# Patient Record
Sex: MALE | Race: WHITE | NOT HISPANIC OR LATINO | Employment: FULL TIME | URBAN - METROPOLITAN AREA
[De-identification: names, ages, dates, MRNs, and addresses within clinical notes are randomized per-mention and may not be internally consistent; named-entity substitution may affect disease eponyms.]

---

## 2018-10-10 ENCOUNTER — TELEPHONE (OUTPATIENT)
Dept: FAMILY MEDICINE | Facility: CLINIC | Age: 63
End: 2018-10-10

## 2018-10-10 DIAGNOSIS — Z01.818 PRE-OP EXAM: Primary | ICD-10-CM

## 2018-10-10 NOTE — TELEPHONE ENCOUNTER
Pt is having retinal surgery with Dr. Hurley and will be sedated.  What labs do you want for the pre-op?

## 2018-10-11 NOTE — TELEPHONE ENCOUNTER
Cbc, cmp for pre op but if he wants his routine labs drawn at the same time we can order them. It looks like he hasn't had routine labs since 2014

## 2018-10-13 LAB
ALBUMIN SERPL-MCNC: 4.6 G/DL (ref 3.6–4.8)
ALBUMIN/GLOB SERPL: 2.2 {RATIO} (ref 1.2–2.2)
ALP SERPL-CCNC: 74 IU/L (ref 39–117)
ALT SERPL-CCNC: 15 IU/L (ref 0–44)
AST SERPL-CCNC: 14 IU/L (ref 0–40)
BASOPHILS # BLD AUTO: 0 X10E3/UL (ref 0–0.2)
BASOPHILS NFR BLD AUTO: 0 %
BILIRUB SERPL-MCNC: 0.6 MG/DL (ref 0–1.2)
BUN SERPL-MCNC: 25 MG/DL (ref 8–27)
BUN/CREAT SERPL: 24 (ref 10–24)
CALCIUM SERPL-MCNC: 9.1 MG/DL (ref 8.6–10.2)
CHLORIDE SERPL-SCNC: 103 MMOL/L (ref 96–106)
CO2 SERPL-SCNC: 21 MMOL/L (ref 20–29)
CREAT SERPL-MCNC: 1.03 MG/DL (ref 0.76–1.27)
EGFR IF AFRICAN AMERICAN: 89 ML/MIN/1.73
EOSINOPHIL # BLD AUTO: 0.2 X10E3/UL (ref 0–0.4)
EOSINOPHIL NFR BLD AUTO: 3 %
ERYTHROCYTE [DISTWIDTH] IN BLOOD BY AUTOMATED COUNT: 13.2 % (ref 12.3–15.4)
EST. GFR  (NON AFRICAN AMERICAN): 77 ML/MIN/1.73
GLOBULIN SER CALC-MCNC: 2.1 G/DL (ref 1.5–4.5)
GLUCOSE SERPL-MCNC: 135 MG/DL (ref 65–99)
HCT VFR BLD AUTO: 45.5 % (ref 37.5–51)
HGB BLD-MCNC: 15.3 G/DL (ref 13–17.7)
IMM GRANULOCYTES # BLD: 0 X10E3/UL (ref 0–0.1)
IMM GRANULOCYTES NFR BLD: 0 %
LYMPHOCYTES # BLD AUTO: 1.2 X10E3/UL (ref 0.7–3.1)
LYMPHOCYTES NFR BLD AUTO: 21 %
MCH RBC QN AUTO: 29.4 PG (ref 26.6–33)
MCHC RBC AUTO-ENTMCNC: 33.6 G/DL (ref 31.5–35.7)
MCV RBC AUTO: 88 FL (ref 79–97)
MONOCYTES # BLD AUTO: 0.4 X10E3/UL (ref 0.1–0.9)
MONOCYTES NFR BLD AUTO: 7 %
NEUTROPHILS # BLD AUTO: 3.9 X10E3/UL (ref 1.4–7)
NEUTROPHILS NFR BLD AUTO: 69 %
PLATELET # BLD AUTO: 183 X10E3/UL (ref 150–379)
POTASSIUM SERPL-SCNC: 4.4 MMOL/L (ref 3.5–5.2)
PROT SERPL-MCNC: 6.7 G/DL (ref 6–8.5)
RBC # BLD AUTO: 5.2 X10E6/UL (ref 4.14–5.8)
SODIUM SERPL-SCNC: 144 MMOL/L (ref 134–144)
WBC # BLD AUTO: 5.7 X10E3/UL (ref 3.4–10.8)

## 2018-10-16 ENCOUNTER — OFFICE VISIT (OUTPATIENT)
Dept: FAMILY MEDICINE | Facility: CLINIC | Age: 63
End: 2018-10-16
Payer: COMMERCIAL

## 2018-10-16 VITALS
HEART RATE: 74 BPM | OXYGEN SATURATION: 97 % | WEIGHT: 226 LBS | DIASTOLIC BLOOD PRESSURE: 70 MMHG | HEIGHT: 69 IN | SYSTOLIC BLOOD PRESSURE: 110 MMHG | BODY MASS INDEX: 33.47 KG/M2

## 2018-10-16 DIAGNOSIS — R73.9 HYPERGLYCEMIA: ICD-10-CM

## 2018-10-16 DIAGNOSIS — Z01.818 PRE-OP EXAM: Primary | ICD-10-CM

## 2018-10-16 LAB — HBA1C MFR BLD: 5.2 %

## 2018-10-16 PROCEDURE — 83036 HEMOGLOBIN GLYCOSYLATED A1C: CPT | Mod: QW,,, | Performed by: NURSE PRACTITIONER

## 2018-10-16 PROCEDURE — 99203 OFFICE O/P NEW LOW 30 MIN: CPT | Mod: ,,, | Performed by: NURSE PRACTITIONER

## 2018-10-16 NOTE — PATIENT INSTRUCTIONS
4 Steps for Eating Healthier  Changing the way you eat can improve your health. It can lower your cholesterol and blood pressure, and help you stay at a healthy weight. Your diet doesnt have to be bland and boring to be healthy. Just watch your calories and follow these steps:    1. Eat fewer unhealthy fats  · Choose more fish and lean meats instead of fatty cuts of meat.  · Skip butter and lard, and use less margarine.  · Pass on foods that have palm, coconut, or hydrogenated oils.  · Eat fewer high-fat dairy foods like cheese, ice cream, and whole milk.  · Get a heart-healthy cookbook and try some low-fat recipes.  2. Go light on salt  · Keep the saltshaker off the table.  · Limit high-salt ingredients, such as soy sauce, bouillon, and garlic salt.  · Instead of adding salt when cooking, season your food with herbs and flavorings. Try lemon, garlic, and onion.  · Limit convenience foods, such as boxed or canned foods and restaurant food.  · Read food labels and choose lower-sodium options.  3. Limit sugar  · Pause before you add sugars to pancakes, cereal, coffee, or tea. This includes white and brown table sugar, syrup, honey, and molasses. Cut your usual amount by half.  · Use non-sugar sweeteners. Stevia, aspartame, and sucralose can satisfy a sweet tooth without adding calories.  · Swap out sugar-filled soda and other drinks. Buy sugar-free or low-calorie beverages. Remember water is always the best choice.  · Read labels and choose foods with less added sugar. Keep in mind that dairy foods and foods with fruit will have some natural sugar.  · Cut the sugar in recipes by 1/3 to 1/2. Boost the flavor with extracts like almond, vanilla, or orange. Or add spices such as cinnamon or nutmeg.  4. Eat more fiber  · Eat fresh fruits and vegetables every day.  · Boost your diet with whole grains. Go for oats, whole-grain rice, and bran.  · Add beans and lentils to your meals.  · Drink more water to match your fiber  increase. This is to help prevent constipation.  Date Last Reviewed: 5/11/2015  © 6979-5363 [employer], [employer]. [address] [place] [address], [place] [address]. All rights reserved. This information is not intended as a substitute for professional medical care. Always follow your healthcare professional's instructions.

## 2018-10-16 NOTE — PROGRESS NOTES
SUBJECTIVE:      Patient ID: Jacky Greenfield is a 63 y.o. male.    Chief Complaint: Pre-op Exam (Left eye surgery)    Patient is here for medical clearance for eye surgery with Dr Hurley. He had an angiogram a little over a year ago with Dr Luciano that showed normal coronaries according to the procedure report. He has not had chest pain or a change in exercise tolerance since then. He mows his lawn with a push mower and exercises twice a week without chest pain or exertional shortness of breath.         Past Surgical History:   Procedure Laterality Date    APPENDECTOMY      EYE SURGERY       Family History   Problem Relation Age of Onset    Heart disease Father       Social History     Socioeconomic History    Marital status:      Spouse name: None    Number of children: None    Years of education: None    Highest education level: None   Social Needs    Financial resource strain: None    Food insecurity - worry: None    Food insecurity - inability: None    Transportation needs - medical: None    Transportation needs - non-medical: None   Occupational History    None   Tobacco Use    Smoking status: Never Smoker    Smokeless tobacco: Never Used   Substance and Sexual Activity    Alcohol use: No     Frequency: Never    Drug use: No    Sexual activity: Yes   Other Topics Concern    None   Social History Narrative    None     No current outpatient medications on file.     No current facility-administered medications for this visit.      Review of patient's allergies indicates:   Allergen Reactions    Percocet [oxycodone-acetaminophen] Rash      Past Medical History:   Diagnosis Date    Cataract      Past Surgical History:   Procedure Laterality Date    APPENDECTOMY      EYE SURGERY         Review of Systems   Constitutional: Negative for appetite change, chills, diaphoresis and unexpected weight change.   HENT: Negative for ear discharge, facial swelling, hearing loss, nosebleeds  "and trouble swallowing.    Eyes: Negative for photophobia, pain and visual disturbance.   Respiratory: Negative for apnea, choking, shortness of breath and wheezing.    Cardiovascular: Negative for chest pain and palpitations.   Gastrointestinal: Negative for abdominal pain, blood in stool and vomiting.   Endocrine: Negative for polyphagia.   Genitourinary: Negative for difficulty urinating and hematuria.   Musculoskeletal: Negative for gait problem and joint swelling.   Skin: Negative for pallor.   Neurological: Negative for dizziness, seizures, speech difficulty, weakness, light-headedness and headaches.   Hematological: Does not bruise/bleed easily.   Psychiatric/Behavioral: Negative for agitation and confusion.      OBJECTIVE:      Vitals:    10/16/18 1513   BP: 110/70   Pulse: 74   SpO2: 97%   Weight: 102.5 kg (226 lb)   Height: 5' 9" (1.753 m)     Physical Exam   Constitutional: He is oriented to person, place, and time. He appears well-developed and well-nourished.   HENT:   Head: Atraumatic.   Eyes: Conjunctivae are normal.   Neck: Neck supple.   Cardiovascular: Normal rate, regular rhythm, normal heart sounds and intact distal pulses.   Pulmonary/Chest: Effort normal and breath sounds normal.   Abdominal: Soft. Bowel sounds are normal. He exhibits no distension.   Musculoskeletal: Normal range of motion.   Neurological: He is alert and oriented to person, place, and time.   Skin: Skin is warm and dry.   Psychiatric: He has a normal mood and affect.   Nursing note and vitals reviewed.     Assessment:       1. Pre-op exam    2. Hyperglycemia        Plan:       Pre-op exam  >4 METS  Patient medically cleared for eye surgery       Hyperglycemia  -     Hemoglobin A1C, POCT                    5.2    Follow-up if symptoms worsen or fail to improve.      10/16/2018 JONO Zee, FNP      "

## 2019-09-25 ENCOUNTER — IMMUNIZATION (OUTPATIENT)
Dept: URGENT CARE | Facility: CLINIC | Age: 64
End: 2019-09-25
Payer: COMMERCIAL

## 2019-09-25 PROCEDURE — 90686 PR FLU VACCINE, QIIV4, NO PRSV, 0.5 ML, IM: ICD-10-PCS | Mod: S$GLB,ICN,, | Performed by: EMERGENCY MEDICINE

## 2019-09-25 PROCEDURE — 90686 IIV4 VACC NO PRSV 0.5 ML IM: CPT | Mod: S$GLB,ICN,, | Performed by: EMERGENCY MEDICINE

## 2019-09-25 PROCEDURE — 90471 PR IMMUNIZ ADMIN,1 SINGLE/COMB VAC/TOXOID: ICD-10-PCS | Mod: S$GLB,ICN,, | Performed by: EMERGENCY MEDICINE

## 2019-09-25 PROCEDURE — 90471 IMMUNIZATION ADMIN: CPT | Mod: S$GLB,ICN,, | Performed by: EMERGENCY MEDICINE

## 2021-09-17 ENCOUNTER — OFFICE VISIT (OUTPATIENT)
Dept: PODIATRY | Facility: CLINIC | Age: 66
End: 2021-09-17
Payer: COMMERCIAL

## 2021-09-17 VITALS — BODY MASS INDEX: 32.58 KG/M2 | WEIGHT: 220 LBS | RESPIRATION RATE: 16 BRPM | HEIGHT: 69 IN

## 2021-09-17 DIAGNOSIS — L60.0 INGROWN NAIL: Primary | ICD-10-CM

## 2021-09-17 DIAGNOSIS — M79.674 PAIN OF RIGHT GREAT TOE: ICD-10-CM

## 2021-09-17 PROCEDURE — 1101F PR PT FALLS ASSESS DOC 0-1 FALLS W/OUT INJ PAST YR: ICD-10-PCS | Mod: CPTII,S$GLB,, | Performed by: PODIATRIST

## 2021-09-17 PROCEDURE — 11750 NAIL REMOVAL: ICD-10-PCS | Mod: T5,S$GLB,, | Performed by: PODIATRIST

## 2021-09-17 PROCEDURE — 1159F PR MEDICATION LIST DOCUMENTED IN MEDICAL RECORD: ICD-10-PCS | Mod: CPTII,S$GLB,, | Performed by: PODIATRIST

## 2021-09-17 PROCEDURE — 3288F PR FALLS RISK ASSESSMENT DOCUMENTED: ICD-10-PCS | Mod: CPTII,S$GLB,, | Performed by: PODIATRIST

## 2021-09-17 PROCEDURE — 99203 OFFICE O/P NEW LOW 30 MIN: CPT | Mod: 25,S$GLB,, | Performed by: PODIATRIST

## 2021-09-17 PROCEDURE — 99203 PR OFFICE/OUTPT VISIT, NEW, LEVL III, 30-44 MIN: ICD-10-PCS | Mod: 25,S$GLB,, | Performed by: PODIATRIST

## 2021-09-17 PROCEDURE — 3008F BODY MASS INDEX DOCD: CPT | Mod: CPTII,S$GLB,, | Performed by: PODIATRIST

## 2021-09-17 PROCEDURE — 1125F PR PAIN SEVERITY QUANTIFIED, PAIN PRESENT: ICD-10-PCS | Mod: CPTII,S$GLB,, | Performed by: PODIATRIST

## 2021-09-17 PROCEDURE — 11750 EXCISION NAIL&NAIL MATRIX: CPT | Mod: T5,S$GLB,, | Performed by: PODIATRIST

## 2021-09-17 PROCEDURE — 1159F MED LIST DOCD IN RCRD: CPT | Mod: CPTII,S$GLB,, | Performed by: PODIATRIST

## 2021-09-17 PROCEDURE — 1160F RVW MEDS BY RX/DR IN RCRD: CPT | Mod: CPTII,S$GLB,, | Performed by: PODIATRIST

## 2021-09-17 PROCEDURE — 3288F FALL RISK ASSESSMENT DOCD: CPT | Mod: CPTII,S$GLB,, | Performed by: PODIATRIST

## 2021-09-17 PROCEDURE — 1160F PR REVIEW ALL MEDS BY PRESCRIBER/CLIN PHARMACIST DOCUMENTED: ICD-10-PCS | Mod: CPTII,S$GLB,, | Performed by: PODIATRIST

## 2021-09-17 PROCEDURE — 1125F AMNT PAIN NOTED PAIN PRSNT: CPT | Mod: CPTII,S$GLB,, | Performed by: PODIATRIST

## 2021-09-17 PROCEDURE — 1101F PT FALLS ASSESS-DOCD LE1/YR: CPT | Mod: CPTII,S$GLB,, | Performed by: PODIATRIST

## 2021-09-17 PROCEDURE — 3008F PR BODY MASS INDEX (BMI) DOCUMENTED: ICD-10-PCS | Mod: CPTII,S$GLB,, | Performed by: PODIATRIST

## 2021-09-17 RX ORDER — IBUPROFEN 600 MG/1
TABLET ORAL
COMMUNITY
End: 2021-09-17

## 2021-09-17 RX ORDER — PANTOPRAZOLE SODIUM 40 MG/1
TABLET, DELAYED RELEASE ORAL
COMMUNITY
End: 2021-09-17

## 2021-09-17 RX ORDER — IBUPROFEN 200 MG
TABLET ORAL
COMMUNITY
End: 2021-09-17

## 2021-09-17 RX ORDER — GABAPENTIN 300 MG/1
CAPSULE ORAL 2 TIMES DAILY
COMMUNITY
End: 2021-09-17

## 2021-09-17 RX ORDER — SUCRALFATE 1 G/1
TABLET ORAL
COMMUNITY
End: 2021-09-17

## 2022-01-07 ENCOUNTER — OFFICE VISIT (OUTPATIENT)
Dept: URGENT CARE | Facility: CLINIC | Age: 67
End: 2022-01-07
Payer: COMMERCIAL

## 2022-01-07 ENCOUNTER — HOSPITAL ENCOUNTER (OUTPATIENT)
Facility: HOSPITAL | Age: 67
Discharge: HOME | End: 2022-01-09
Attending: EMERGENCY MEDICINE | Admitting: INTERNAL MEDICINE
Payer: COMMERCIAL

## 2022-01-07 ENCOUNTER — TELEPHONE (OUTPATIENT)
Dept: ORTHOPEDICS | Facility: CLINIC | Age: 67
End: 2022-01-07
Payer: COMMERCIAL

## 2022-01-07 VITALS
HEART RATE: 68 BPM | OXYGEN SATURATION: 97 % | DIASTOLIC BLOOD PRESSURE: 83 MMHG | RESPIRATION RATE: 16 BRPM | SYSTOLIC BLOOD PRESSURE: 140 MMHG | TEMPERATURE: 98 F | HEIGHT: 69 IN | WEIGHT: 224.63 LBS | BODY MASS INDEX: 33.27 KG/M2

## 2022-01-07 DIAGNOSIS — L08.9 LACERATION OF FINGER WITH INFECTION, INITIAL ENCOUNTER: Primary | ICD-10-CM

## 2022-01-07 DIAGNOSIS — L03.012 CELLULITIS OF LEFT INDEX FINGER: Primary | ICD-10-CM

## 2022-01-07 DIAGNOSIS — S61.219A LACERATION OF FINGER WITH INFECTION, INITIAL ENCOUNTER: Primary | ICD-10-CM

## 2022-01-07 DIAGNOSIS — L02.512 ABSCESS OF LEFT INDEX FINGER: ICD-10-CM

## 2022-01-07 LAB
ALBUMIN SERPL BCP-MCNC: 4.8 G/DL (ref 3.5–5.2)
ALP SERPL-CCNC: 83 U/L (ref 55–135)
ALT SERPL W/O P-5'-P-CCNC: 31 U/L (ref 10–44)
ANION GAP SERPL CALC-SCNC: 10 MMOL/L (ref 8–16)
AST SERPL-CCNC: 24 U/L (ref 10–40)
BASOPHILS # BLD AUTO: 0.03 K/UL (ref 0–0.2)
BASOPHILS NFR BLD: 0.4 % (ref 0–1.9)
BILIRUB SERPL-MCNC: 1.1 MG/DL (ref 0.1–1)
BUN SERPL-MCNC: 31 MG/DL (ref 8–23)
CALCIUM SERPL-MCNC: 9.4 MG/DL (ref 8.7–10.5)
CHLORIDE SERPL-SCNC: 105 MMOL/L (ref 95–110)
CO2 SERPL-SCNC: 24 MMOL/L (ref 23–29)
CREAT SERPL-MCNC: 0.8 MG/DL (ref 0.5–1.4)
DIFFERENTIAL METHOD: ABNORMAL
EOSINOPHIL # BLD AUTO: 0.1 K/UL (ref 0–0.5)
EOSINOPHIL NFR BLD: 1.3 % (ref 0–8)
ERYTHROCYTE [DISTWIDTH] IN BLOOD BY AUTOMATED COUNT: 13.2 % (ref 11.5–14.5)
EST. GFR  (AFRICAN AMERICAN): >60 ML/MIN/1.73 M^2
EST. GFR  (NON AFRICAN AMERICAN): >60 ML/MIN/1.73 M^2
GLUCOSE SERPL-MCNC: 79 MG/DL (ref 70–110)
HCT VFR BLD AUTO: 48.9 % (ref 40–54)
HGB BLD-MCNC: 15.8 G/DL (ref 14–18)
IMM GRANULOCYTES # BLD AUTO: 0.02 K/UL (ref 0–0.04)
IMM GRANULOCYTES NFR BLD AUTO: 0.2 % (ref 0–0.5)
LYMPHOCYTES # BLD AUTO: 1.4 K/UL (ref 1–4.8)
LYMPHOCYTES NFR BLD: 16.7 % (ref 18–48)
MCH RBC QN AUTO: 29.6 PG (ref 27–31)
MCHC RBC AUTO-ENTMCNC: 32.3 G/DL (ref 32–36)
MCV RBC AUTO: 92 FL (ref 82–98)
MONOCYTES # BLD AUTO: 0.8 K/UL (ref 0.3–1)
MONOCYTES NFR BLD: 9.2 % (ref 4–15)
NEUTROPHILS # BLD AUTO: 6 K/UL (ref 1.8–7.7)
NEUTROPHILS NFR BLD: 72.2 % (ref 38–73)
NRBC BLD-RTO: 0 /100 WBC
PLATELET # BLD AUTO: 228 K/UL (ref 150–450)
PMV BLD AUTO: 9.1 FL (ref 9.2–12.9)
POTASSIUM SERPL-SCNC: 3.6 MMOL/L (ref 3.5–5.1)
PROT SERPL-MCNC: 7.6 G/DL (ref 6–8.4)
RBC # BLD AUTO: 5.33 M/UL (ref 4.6–6.2)
SARS-COV-2 RDRP RESP QL NAA+PROBE: NEGATIVE
SODIUM SERPL-SCNC: 139 MMOL/L (ref 136–145)
WBC # BLD AUTO: 8.26 K/UL (ref 3.9–12.7)

## 2022-01-07 PROCEDURE — 63600175 PHARM REV CODE 636 W HCPCS: Performed by: EMERGENCY MEDICINE

## 2022-01-07 PROCEDURE — 3079F PR MOST RECENT DIASTOLIC BLOOD PRESSURE 80-89 MM HG: ICD-10-PCS | Mod: CPTII,S$GLB,, | Performed by: NURSE PRACTITIONER

## 2022-01-07 PROCEDURE — G0378 HOSPITAL OBSERVATION PER HR: HCPCS

## 2022-01-07 PROCEDURE — 96366 THER/PROPH/DIAG IV INF ADDON: CPT

## 2022-01-07 PROCEDURE — U0002 COVID-19 LAB TEST NON-CDC: HCPCS | Performed by: EMERGENCY MEDICINE

## 2022-01-07 PROCEDURE — 25000003 PHARM REV CODE 250: Performed by: EMERGENCY MEDICINE

## 2022-01-07 PROCEDURE — 90715 TDAP VACCINE 7 YRS/> IM: CPT | Mod: S$GLB,,, | Performed by: NURSE PRACTITIONER

## 2022-01-07 PROCEDURE — 96372 THER/PROPH/DIAG INJ SC/IM: CPT | Mod: S$GLB,,, | Performed by: NURSE PRACTITIONER

## 2022-01-07 PROCEDURE — 3077F SYST BP >= 140 MM HG: CPT | Mod: CPTII,S$GLB,, | Performed by: NURSE PRACTITIONER

## 2022-01-07 PROCEDURE — 99204 OFFICE O/P NEW MOD 45 MIN: CPT | Mod: 25,S$GLB,, | Performed by: NURSE PRACTITIONER

## 2022-01-07 PROCEDURE — 3008F BODY MASS INDEX DOCD: CPT | Mod: CPTII,S$GLB,, | Performed by: NURSE PRACTITIONER

## 2022-01-07 PROCEDURE — 3077F PR MOST RECENT SYSTOLIC BLOOD PRESSURE >= 140 MM HG: ICD-10-PCS | Mod: CPTII,S$GLB,, | Performed by: NURSE PRACTITIONER

## 2022-01-07 PROCEDURE — 73140 X-RAY EXAM OF FINGER(S): CPT | Mod: LT,S$GLB,, | Performed by: RADIOLOGY

## 2022-01-07 PROCEDURE — 96365 THER/PROPH/DIAG IV INF INIT: CPT

## 2022-01-07 PROCEDURE — 1159F PR MEDICATION LIST DOCUMENTED IN MEDICAL RECORD: ICD-10-PCS | Mod: CPTII,S$GLB,, | Performed by: NURSE PRACTITIONER

## 2022-01-07 PROCEDURE — 73140 XR FINGER 2 OR MORE VIEWS LEFT: ICD-10-PCS | Mod: LT,S$GLB,, | Performed by: RADIOLOGY

## 2022-01-07 PROCEDURE — 1160F PR REVIEW ALL MEDS BY PRESCRIBER/CLIN PHARMACIST DOCUMENTED: ICD-10-PCS | Mod: CPTII,S$GLB,, | Performed by: NURSE PRACTITIONER

## 2022-01-07 PROCEDURE — 90471 TDAP VACCINE GREATER THAN OR EQUAL TO 7YO IM: ICD-10-PCS | Mod: 59,S$GLB,, | Performed by: NURSE PRACTITIONER

## 2022-01-07 PROCEDURE — 96372 PR INJECTION,THERAP/PROPH/DIAG2ST, IM OR SUBCUT: ICD-10-PCS | Mod: S$GLB,,, | Performed by: NURSE PRACTITIONER

## 2022-01-07 PROCEDURE — 90715 TDAP VACCINE GREATER THAN OR EQUAL TO 7YO IM: ICD-10-PCS | Mod: S$GLB,,, | Performed by: NURSE PRACTITIONER

## 2022-01-07 PROCEDURE — 99204 PR OFFICE/OUTPT VISIT, NEW, LEVL IV, 45-59 MIN: ICD-10-PCS | Mod: 25,S$GLB,, | Performed by: NURSE PRACTITIONER

## 2022-01-07 PROCEDURE — 90471 IMMUNIZATION ADMIN: CPT | Mod: 59,S$GLB,, | Performed by: NURSE PRACTITIONER

## 2022-01-07 PROCEDURE — 80053 COMPREHEN METABOLIC PANEL: CPT | Performed by: EMERGENCY MEDICINE

## 2022-01-07 PROCEDURE — 1160F RVW MEDS BY RX/DR IN RCRD: CPT | Mod: CPTII,S$GLB,, | Performed by: NURSE PRACTITIONER

## 2022-01-07 PROCEDURE — 85025 COMPLETE CBC W/AUTO DIFF WBC: CPT | Performed by: EMERGENCY MEDICINE

## 2022-01-07 PROCEDURE — 10060 I&D ABSCESS SIMPLE/SINGLE: CPT

## 2022-01-07 PROCEDURE — 3008F PR BODY MASS INDEX (BMI) DOCUMENTED: ICD-10-PCS | Mod: CPTII,S$GLB,, | Performed by: NURSE PRACTITIONER

## 2022-01-07 PROCEDURE — 3079F DIAST BP 80-89 MM HG: CPT | Mod: CPTII,S$GLB,, | Performed by: NURSE PRACTITIONER

## 2022-01-07 PROCEDURE — 99285 EMERGENCY DEPT VISIT HI MDM: CPT | Mod: 25

## 2022-01-07 PROCEDURE — 26010 DRAINAGE OF FINGER ABSCESS: CPT | Mod: F1

## 2022-01-07 PROCEDURE — 87040 BLOOD CULTURE FOR BACTERIA: CPT | Performed by: EMERGENCY MEDICINE

## 2022-01-07 PROCEDURE — 1159F MED LIST DOCD IN RCRD: CPT | Mod: CPTII,S$GLB,, | Performed by: NURSE PRACTITIONER

## 2022-01-07 RX ORDER — DOXYCYCLINE 100 MG/1
100 CAPSULE ORAL 2 TIMES DAILY
Qty: 20 CAPSULE | Refills: 0 | Status: ON HOLD | OUTPATIENT
Start: 2022-01-07 | End: 2022-01-09 | Stop reason: HOSPADM

## 2022-01-07 RX ORDER — ACETAMINOPHEN 325 MG/1
325 TABLET ORAL EVERY 6 HOURS PRN
COMMUNITY

## 2022-01-07 RX ORDER — LIDOCAINE HYDROCHLORIDE 10 MG/ML
5 INJECTION, SOLUTION EPIDURAL; INFILTRATION; INTRACAUDAL; PERINEURAL
Status: COMPLETED | OUTPATIENT
Start: 2022-01-07 | End: 2022-01-07

## 2022-01-07 RX ORDER — MUPIROCIN 20 MG/G
OINTMENT TOPICAL 3 TIMES DAILY
Qty: 30 G | Refills: 0 | Status: ON HOLD | OUTPATIENT
Start: 2022-01-07 | End: 2022-01-09 | Stop reason: HOSPADM

## 2022-01-07 RX ORDER — CEFTRIAXONE 1 G/1
1 INJECTION, POWDER, FOR SOLUTION INTRAMUSCULAR; INTRAVENOUS
Status: DISCONTINUED | OUTPATIENT
Start: 2022-01-07 | End: 2022-01-07 | Stop reason: HOSPADM

## 2022-01-07 RX ADMIN — CEFTRIAXONE 1 G: 1 INJECTION, POWDER, FOR SOLUTION INTRAMUSCULAR; INTRAVENOUS at 11:01

## 2022-01-07 RX ADMIN — LIDOCAINE HYDROCHLORIDE 50 MG: 10 INJECTION, SOLUTION EPIDURAL; INFILTRATION; INTRACAUDAL; PERINEURAL at 06:01

## 2022-01-07 RX ADMIN — PIPERACILLIN AND TAZOBACTAM 4.5 G: 4; .5 INJECTION, POWDER, LYOPHILIZED, FOR SOLUTION INTRAVENOUS; PARENTERAL at 07:01

## 2022-01-07 NOTE — TELEPHONE ENCOUNTER
Contacted patient. Advised ER evaluation per Dr. Altman. Patient agreeable. Janette Henderson LPN

## 2022-01-07 NOTE — ED PROVIDER NOTES
Encounter Date: 1/7/2022       History     Chief Complaint   Patient presents with    FINGER INFECTION     SAT OR SUN LEFT INDEX FINGER INJURY/LAC, SEEN AT [facility] TODAY AND GIVEN A ROCEPHIN AND GIVEN RX FOR DOXY AND BACTROBAN OINTMENT     66-year-old male presents to the ER 7 days out from sticking himself in the left index finger with a knife 3 times while he was cleaning a deer.  He reports that he has been gradually developing redness swelling warmth and purulent discharge underneath the skin.  He was seen in urgent care today given a dose of Rocephin IM and a prescription for doxycycline and mupirocin.  But was told to come here for evaluation by Hand surgery.  He denies any fevers chills or sweats no nausea or vomiting.  He denies diabetes or other immunocompromised.  His tetanus was updated at the urgent care.  They did an x-ray that revealed no foreign body or fracture.        Review of patient's allergies indicates:   Allergen Reactions    Gabapentin      Pt states made him sleepy/drowsy    Percocet [oxycodone-acetaminophen] Rash     Past Medical History:   Diagnosis Date    Cataract     Cervical radiculopathy      Past Surgical History:   Procedure Laterality Date    APPENDECTOMY      EYE SURGERY       Family History   Problem Relation Age of Onset    Heart disease Father      Social History     Tobacco Use    Smoking status: Never Smoker    Smokeless tobacco: Never Used   Substance Use Topics    Alcohol use: No    Drug use: No     Review of Systems   Constitutional: Negative for chills, diaphoresis and fever.   Gastrointestinal: Negative for nausea and vomiting.   Musculoskeletal: Positive for arthralgias, joint swelling and myalgias.   Skin: Positive for color change, rash and wound. Negative for pallor.   Allergic/Immunologic: Negative for immunocompromised state.   Neurological: Negative for weakness and numbness.   Hematological: Does not bruise/bleed easily.       Physical Exam      Initial Vitals [01/07/22 1320]   BP Pulse Resp Temp SpO2   137/89 65 20 97.6 °F (36.4 °C) 97 %      MAP       --         Physical Exam    Nursing note and vitals reviewed.  Constitutional: He appears well-developed and well-nourished. He is not diaphoretic. No distress.   HENT:   Head: Atraumatic.   Cardiovascular: Normal rate and intact distal pulses.   Pulmonary/Chest: No respiratory distress.   Musculoskeletal:         General: Tenderness and edema present.      Comments: Decreased range of motion of right index finger at D IP PIP joints due to swelling.  5/5 strength flexion and extension at D IP PIP and MCP joints of the right index finger.    There is signs of cellulitis erythema and mild bruising of the index finger and a superficial purulent blister the patient attempted to drain.     Neurological: He is alert and oriented to person, place, and time. He has normal strength. No sensory deficit. GCS score is 15. GCS eye subscore is 4. GCS verbal subscore is 5. GCS motor subscore is 6.   Skin: Skin is warm and dry. Capillary refill takes less than 2 seconds. No rash and no abscess noted. There is erythema. No pallor.   Psychiatric: He has a normal mood and affect. His behavior is normal. Thought content normal.                         ED Course   I & D - Incision and Drainage    Date/Time: 1/7/2022 6:28 PM  Location procedure was performed: [facility] EMERGENCY DEPARTMENT  Performed by: Margy Corcoran MD  Authorized by: Margy Corcoran MD   Consent Done: Yes  Consent: Verbal consent obtained.  Consent given by: patient  Patient understanding: patient states understanding of the procedure being performed  Patient consent: the patient's understanding of the procedure matches consent given  Procedure consent: procedure consent matches procedure scheduled  Relevant documents: relevant documents present and verified  Test results: test results available and properly labeled  Site marked: the operative site  was marked  Imaging studies: imaging studies available  Indications for incision and drainage: Infected blister.  Body area: upper extremity  Location details: left index finger  Anesthesia: local infiltration    Anesthesia:  Local Anesthetic: lidocaine 1% without epinephrine  Anesthetic total: 0.2 mL    Patient sedated: no  Scalpel size: 11  Incision type: single straight  Complexity: simple  Drainage: bloody  Drainage amount: scant  Wound treatment: incision and  no return  Packing material: none  Patient tolerance: Patient tolerated the procedure well with no immediate complications        Labs Reviewed   CBC W/ AUTO DIFFERENTIAL - Abnormal; Notable for the following components:       Result Value    MPV 9.1 (*)     Lymph % 16.7 (*)     All other components within normal limits   CULTURE, BLOOD   CULTURE, BLOOD   COMPREHENSIVE METABOLIC PANEL   SARS-COV-2 RNA AMPLIFICATION, QUAL          Imaging Results    None          Medications   LIDOcaine (PF) 10 mg/ml (1%) injection 50 mg (has no administration in time range)     Medical Decision Making:   History:   Old Medical Records: I decided to obtain old medical records.  Old Records Summarized: records from clinic visits.  Clinical Tests:   Lab Tests: Ordered and Reviewed       <> Summary of Lab: covid neg   ED Management:  66-year-old male presents to the ER 7 days out from sticking himself in the left index finger with a knife 3 times while he was cleaning a deer.  He reports that he has been gradually developing redness swelling warmth and purulent discharge underneath the skin.  He was seen in urgent care today given a dose of Rocephin IM and a prescription for doxycycline and mupirocin.  But was told to come here for evaluation by Hand surgery.  He denies any fevers chills or sweats no nausea or vomiting.  He denies diabetes or other immunocompromised.  His tetanus was updated at the urgent care.  They did an x-ray that revealed no foreign body or  fracture.  Vital signs are normal patient is afebrile physical exam patient has erythema swelling and signs of superficial abscess under the epidermis to the dorsal side of the left index finger.  Patient is right-hand dominant.  No signs of flexor tenosynovitis.  Patient only has limited range of motion by the amount of swelling he has to the finger.  No extension of cellulitis to the hand.  Patient has normal sensation, mildly decreased range of motion due to swelling but normal 5/5 strength flexion extension at D IP PIP and MCP joints.  No systemic signs of infection.  I spoke with Dr. Massey and he was seen pictures of the left index finger he agrees to seeing the patient in the hospital as a consultant.  Medicine is to admit.  He would like me to try to I&D the infected blister which I have done but I did not get out any purulent discharge patient had already drained it at home.  Patient received Rocephin this morning IM.  He will be placed on vanc and Zosyn IV here after his dose Rocephin this morning.  CBC was normal, COVID negative CMP in process blood cultures pending  Margy Corcoran M.D.  6:31 PM 1/7/2022    Other:   I have discussed this case with another health care provider.       <> Summary of the Discussion: Dr. Massey. Will consult                         Clinical Impression:   Final diagnoses:  [L03.012] Cellulitis of left index finger (Primary)  [L02.512] Abscess of left index finger          ED Disposition Condition    Admit               Margy Corcoran MD  01/07/22 9718

## 2022-01-07 NOTE — PATIENT INSTRUCTIONS
ED immediately for spreading redness, increased swelling, fever above 100.5, decreased movement or decreased sensation, or any other new or worsening symptoms.  See orthopedist Monday for evaluation.

## 2022-01-07 NOTE — PROGRESS NOTES
"Subjective:       Patient ID: Jacky Greenfield is a 66 y.o. male.    Vitals:  height is 5' 9" (1.753 m) and weight is 101.9 kg (224 lb 9.6 oz). His oral temperature is 97.6 °F (36.4 °C). His blood pressure is 140/83 (abnormal) and his pulse is 68. His respiration is 16 and oxygen saturation is 97%.     Chief Complaint: Finger Pain    Pt states "Was cleaning a deer head and knicked his left index finger with the knif he was using; swollen, painful, bruised x's 5 days; tried epsom salt soaks and Neosporin with no relief." Has had increased redness and swelling noted.    Past Medical History:  No date: Cataract  No date: Cervical radiculopathy    Past Surgical History:  No date: APPENDECTOMY  No date: EYE SURGERY    Review of patient's family history indicates:  Problem: Heart disease      Relation: Father          Age of Onset: (Not Specified)      Social History    Socioeconomic History      Marital status:     Tobacco Use      Smoking status: Never Smoker      Smokeless tobacco: Never Used    Substance and Sexual Activity      Alcohol use: No      Drug use: No      Sexual activity: Yes      Current Outpatient Medications:  doxycycline (MONODOX) 100 MG capsule, Take 1 capsule (100 mg total) by mouth 2 (two) times daily., Disp: 20 capsule, Rfl: 0  mupirocin (BACTROBAN) 2 % ointment, Apply topically 3 (three) times daily., Disp: 30 g, Rfl: 0    No current facility-administered medications for this visit.      Review of patient's allergies indicates:   -- Gabapentin     --  Pt states made him sleepy/drowsy   -- Percocet (oxycodone-acetaminophen) -- Rash    Finger Pain  This is a new problem. The current episode started in the past 7 days. The problem occurs constantly. The problem has been gradually worsening. Pertinent negatives include no diaphoresis, fatigue or fever.       Constitution: Negative. Negative for sweating, fatigue and fever.   HENT: Negative.    Respiratory: Negative.    Gastrointestinal: " Negative.    Musculoskeletal: Positive for pain. Negative for abnormal ROM of joint.   Skin: Positive for wound, erythema and bruising. Negative for laceration.   Neurological: Negative.        Objective:      Physical Exam   Constitutional: He is oriented to person, place, and time.  Non-toxic appearance. No distress.   HENT:   Head: Normocephalic and atraumatic.   Abdominal: Normal appearance.   Musculoskeletal:      Left hand: He exhibits normal capillary refill. Decreased sensation is not present in the ulnar distribution, is not present in the medial redistribution and is not present in the radial distribution. Left index finger: Exhibits swelling, ecchymosis and tenderness. Injuries: abrasion and blister. There is rapid refill.        Hands:    Neurological: no focal deficit. He is alert and oriented to person, place, and time.   Skin: erythema   Psychiatric: His behavior is normal. Mood normal.         Assessment:       1. Laceration of finger with infection, initial encounter          Plan:     Finger xray reviewed, no bony abnormality or foreign body noted. Tdap given. Rocephin given. Doxycycline and bactroban, scheduled with orthopedics Monday. Patient aware of date, time, and location of appointment. Advised patient that he should go to ED immediately for any change in swelling or bruising, any systemic symptoms such as fever, any decreased rom or sensation. Discussed risk of osteomyelitis with patient, states understanding that this is a serious condition that could result in loss of digit, he will go to Ed if worsening or changing.     Laceration of finger with infection, initial encounter  -     X-Ray Finger 2 or More Views Left; Future; Expected date: 01/07/2022  -     Tdap Vaccine  -     cefTRIAXone injection 1 g  -     doxycycline (MONODOX) 100 MG capsule; Take 1 capsule (100 mg total) by mouth 2 (two) times daily.  Dispense: 20 capsule; Refill: 0  -     mupirocin (BACTROBAN) 2 % ointment; Apply  topically 3 (three) times daily.  Dispense: 30 g; Refill: 0  -     Ambulatory referral/consult to Orthopedics

## 2022-01-08 PROCEDURE — 99243 OFF/OP CNSLTJ NEW/EST LOW 30: CPT | Mod: ,,, | Performed by: ORTHOPAEDIC SURGERY

## 2022-01-08 PROCEDURE — 96367 TX/PROPH/DG ADDL SEQ IV INF: CPT

## 2022-01-08 PROCEDURE — 25000003 PHARM REV CODE 250: Performed by: INTERNAL MEDICINE

## 2022-01-08 PROCEDURE — 63600175 PHARM REV CODE 636 W HCPCS: Performed by: INTERNAL MEDICINE

## 2022-01-08 PROCEDURE — G0378 HOSPITAL OBSERVATION PER HR: HCPCS

## 2022-01-08 PROCEDURE — 96366 THER/PROPH/DIAG IV INF ADDON: CPT

## 2022-01-08 PROCEDURE — 99243 PR OFFICE CONSULTATION,LEVEL III: ICD-10-PCS | Mod: ,,, | Performed by: ORTHOPAEDIC SURGERY

## 2022-01-08 RX ORDER — ONDANSETRON 2 MG/ML
4 INJECTION INTRAMUSCULAR; INTRAVENOUS EVERY 8 HOURS PRN
Status: DISCONTINUED | OUTPATIENT
Start: 2022-01-08 | End: 2022-01-09 | Stop reason: HOSPADM

## 2022-01-08 RX ORDER — HYDROCODONE BITARTRATE AND ACETAMINOPHEN 5; 325 MG/1; MG/1
1 TABLET ORAL EVERY 6 HOURS PRN
Status: DISCONTINUED | OUTPATIENT
Start: 2022-01-08 | End: 2022-01-09 | Stop reason: HOSPADM

## 2022-01-08 RX ORDER — POLYETHYLENE GLYCOL 3350 17 G/17G
17 POWDER, FOR SOLUTION ORAL DAILY PRN
Status: DISCONTINUED | OUTPATIENT
Start: 2022-01-08 | End: 2022-01-09 | Stop reason: HOSPADM

## 2022-01-08 RX ORDER — TALC
6 POWDER (GRAM) TOPICAL NIGHTLY PRN
Status: DISCONTINUED | OUTPATIENT
Start: 2022-01-08 | End: 2022-01-09 | Stop reason: HOSPADM

## 2022-01-08 RX ORDER — IBUPROFEN 400 MG/1
400 TABLET ORAL EVERY 6 HOURS PRN
Status: DISCONTINUED | OUTPATIENT
Start: 2022-01-08 | End: 2022-01-09 | Stop reason: HOSPADM

## 2022-01-08 RX ADMIN — VANCOMYCIN HYDROCHLORIDE 2000 MG: 500 INJECTION, POWDER, LYOPHILIZED, FOR SOLUTION INTRAVENOUS at 02:01

## 2022-01-08 RX ADMIN — CEFTRIAXONE SODIUM 1 G: 1 INJECTION, POWDER, FOR SOLUTION INTRAMUSCULAR; INTRAVENOUS at 11:01

## 2022-01-08 RX ADMIN — IBUPROFEN 400 MG: 400 TABLET ORAL at 03:01

## 2022-01-08 RX ADMIN — VANCOMYCIN HYDROCHLORIDE 1750 MG: 500 INJECTION, POWDER, LYOPHILIZED, FOR SOLUTION INTRAVENOUS at 03:01

## 2022-01-08 NOTE — HPI
66-year-old pleasant  male with no known significant past medical history presented with left index finger cellulitis.    Reports injuring his left index finger accidentally last weekend when he was cleaning a deer head.  He noticed redness, swelling and pain which was gradually worsening.  He tried Epsom salt soaks and Neosporin ointment no affect.  He attempted drainage and got minimal serosanguineous drainage.  No purulence noted.  He was seen at a local urgent care earlier this morning; was given ceftriaxone and prescription for doxycycline.  He was advised to see orthopedics outpatient.  The orthopedist office reviewed images and advised him to go to the ED.      Rest of the 10 point review of systems is negative except as mentioned above.

## 2022-01-08 NOTE — ED NOTES
Adult Physical Assessment  LOC: Jacky Greenfield, 66 y.o. male verified via two identifiers.  The patient is awake, alert, oriented and speaking appropriately at this time.  APPEARANCE: Patient resting comfortably and appears to be in no acute distress at this time. Patient is clean and well groomed, patient's clothing is properly fastened.  SKIN:The skin is warm and dry, color consistent with ethnicity, patient has normal skin turgor and moist mucus membranes, skin intact, no breakdown or brusing noted.  Pt does have swelling and redness to left index finger. Pt states the redness started this past weekend while skinning a deer he punctured his finger. States it has gotten worse even after going to urgent car. After having ortho look at exrays they recommended him to go to ER.   MUSCULOSKELETAL: Patient moving all extremities well, no obvious swelling or deformities noted.  RESPIRATORY: Airway is open and patent, respirations are spontaneous, patient has a normal effort and rate, no accessory muscle use noted.  CARDIAC: Patient has a normal rate and rhythm, no periphreal edema noted in any extremity, capillary refill < 3 seconds in all extremities  ABDOMEN: Soft and non tender to palpation, no abdominal distention noted. Bowel sounds present in all four quadrants.  NEUROLOGIC: Eyes open spontaneously, behavior appropriate to situation, follows commands, facial expression symmetrical, bilateral hand grasp equal and even, purposeful motor response noted, normal sensation in all extremities when touched with a finger.

## 2022-01-08 NOTE — SUBJECTIVE & OBJECTIVE
Past Medical History:   Diagnosis Date    Cataract     Cervical radiculopathy        Past Surgical History:   Procedure Laterality Date    APPENDECTOMY      EYE SURGERY      SHOULDER SURGERY  09/24/2021    right shoulder surgery, rotator cuff, Dr. Garcia       Review of patient's allergies indicates:   Allergen Reactions    Gabapentin      Pt states made him sleepy/drowsy    Percocet [oxycodone-acetaminophen] Rash       Current Facility-Administered Medications   Medication    cefTRIAXone (ROCEPHIN) 1 g/50 mL D5W IVPB    ibuprofen tablet 400 mg    melatonin tablet 6 mg    ondansetron injection 4 mg    polyethylene glycol packet 17 g    vancomycin (VANCOCIN) 1,750 mg in dextrose 5 % 500 mL IVPB    vancomycin - pharmacy to dose     Family History     Problem Relation (Age of Onset)    Heart disease Father        Tobacco Use    Smoking status: Never Smoker    Smokeless tobacco: Never Used   Substance and Sexual Activity    Alcohol use: No    Drug use: No    Sexual activity: Yes     Review of Systems   Constitutional: Negative for chills and fever.   HENT: Negative for congestion.    Eyes: Negative for blurred vision.   Cardiovascular: Negative for chest pain and syncope.   Respiratory: Negative for cough and shortness of breath.    Endocrine: Negative for polyuria.   Hematologic/Lymphatic: Negative for bleeding problem. Does not bruise/bleed easily.   Skin: Negative for rash.   Musculoskeletal: Positive for joint pain and joint swelling. Negative for falls, muscle cramps, muscle weakness and myalgias.   Gastrointestinal: Negative for abdominal pain, nausea and vomiting.   Genitourinary: Negative for flank pain.   Neurological: Negative for numbness and seizures.   Psychiatric/Behavioral: Negative for altered mental status.   Allergic/Immunologic: Negative for persistent infections.     Objective:     Vital Signs (Most Recent):  Temp: 98.2 °F (36.8 °C) (01/08/22 0718)  Pulse: 62 (01/08/22 0718)  Resp:  "18 (01/08/22 0718)  BP: 119/78 (01/08/22 0718)  SpO2: 95 % (01/08/22 0718) Vital Signs (24h Range):  Temp:  [97.6 °F (36.4 °C)-98.4 °F (36.9 °C)] 98.2 °F (36.8 °C)  Pulse:  [59-65] 62  Resp:  [18-20] 18  SpO2:  [95 %-97 %] 95 %  BP: (109-140)/(59-89) 119/78     Weight: 102.3 kg (225 lb 8.5 oz)  Height: 5' 9.5" (176.5 cm)  Body mass index is 32.83 kg/m².      Intake/Output Summary (Last 24 hours) at 1/8/2022 1020  Last data filed at 1/7/2022 2124  Gross per 24 hour   Intake 100 ml   Output --   Net 100 ml       General    Nursing note and vitals reviewed.  Constitutional: He is oriented to person, place, and time. He appears well-developed and well-nourished. No distress.   HENT:   Nose: Nose normal.   Eyes: EOM are normal.   Cardiovascular: Regular rhythm.    Pulmonary/Chest: Effort normal.   Abdominal: Soft.   Neurological: He is alert and oriented to person, place, and time.   Psychiatric: His behavior is normal.             Right Hand/Wrist Exam   Right hand exam is normal.      Left Hand/Wrist Exam     Comments:  Left index finger with the redness from the fingertips to about the PIP joint.  It is a little circumferential but mostly the dorsum of the finger.  The area that appeared to have purulence was between the D IP joint PIP joint but after local I&D in the ER there was no purulence and the area has flattened out nicely.  No other purulence or abscess formation noted.            Significant Labs:   BMP:   Recent Labs   Lab 01/07/22  1755   GLU 79      K 3.6      CO2 24   BUN 31*   CREATININE 0.8   CALCIUM 9.4     CBC:   Recent Labs   Lab 01/07/22  1726   WBC 8.26   HGB 15.8   HCT 48.9        Coagulation: No results for input(s): LABPROT, INR, APTT in the last 48 hours.  CRP: No results for input(s): CRP in the last 48 hours.  Wound Culture: No results for input(s): LABAERO in the last 48 hours.  All pertinent labs within the past 24 hours have been reviewed.    Significant Imaging: X-Ray: " I have reviewed all pertinent results/findings and my personal findings are:  X-RAYS OF THE LEFT HAND SHOW NO SIGNS of foreign body

## 2022-01-08 NOTE — CONSULTS
[facility]  Orthopedics  Consult Note    Patient Name: Jacky Greenfield  MRN: [id number]  Admission Date: 1/7/2022  Hospital Length of Stay: 0 days  Attending Provider: Rico Muñoz MD  Primary Care Provider: Primary Doctor No    Patient information was obtained from patient and ER records.     Inpatient consult to Orthopedic Surgery  Consult performed by: Samuel Nicholson MD  Consult ordered by: Vinnie Reynoso MD  Reason for consult: Index finger cellulitis        Subjective:     Principal Problem:Cellulitis of left index finger    Chief Complaint:   Chief Complaint   Patient presents with    FINGER INFECTION     SAT OR SUN LEFT INDEX FINGER INJURY/LAC, SEEN AT [facility] TODAY AND GIVEN A ROCEPHIN AND GIVEN RX FOR DOXY AND BACTROBAN OINTMENT        HPI: 66-year-old pleasant  male with no known significant past medical history presented with left index finger cellulitis.     Reports injuring his left index finger accidentally last weekend when he was cleaning a deer head.  He noticed redness, swelling and pain which was gradually worsening.  He tried Epsom salt soaks and Neosporin ointment no affect.  He attempted drainage and got minimal serosanguineous drainage.  No purulence noted.  He was seen at a local urgent care earlier this morning; was given ceftriaxone and prescription for doxycycline.  He was advised to see orthopedics outpatient.  The orthopedist office reviewed images and advised him to go to the ED.      Past Medical History:   Diagnosis Date    Cataract     Cervical radiculopathy        Past Surgical History:   Procedure Laterality Date    APPENDECTOMY      EYE SURGERY      SHOULDER SURGERY  09/24/2021    right shoulder surgery, rotator cuff, Dr. Garcia       Review of patient's allergies indicates:   Allergen Reactions    Gabapentin      Pt states made him sleepy/drowsy    Percocet [oxycodone-acetaminophen] Rash       Current Facility-Administered  "Medications   Medication    cefTRIAXone (ROCEPHIN) 1 g/50 mL D5W IVPB    ibuprofen tablet 400 mg    melatonin tablet 6 mg    ondansetron injection 4 mg    polyethylene glycol packet 17 g    vancomycin (VANCOCIN) 1,750 mg in dextrose 5 % 500 mL IVPB    vancomycin - pharmacy to dose     Family History     Problem Relation (Age of Onset)    Heart disease Father        Tobacco Use    Smoking status: Never Smoker    Smokeless tobacco: Never Used   Substance and Sexual Activity    Alcohol use: No    Drug use: No    Sexual activity: Yes     Review of Systems   Constitutional: Negative for chills and fever.   HENT: Negative for congestion.    Eyes: Negative for blurred vision.   Cardiovascular: Negative for chest pain and syncope.   Respiratory: Negative for cough and shortness of breath.    Endocrine: Negative for polyuria.   Hematologic/Lymphatic: Negative for bleeding problem. Does not bruise/bleed easily.   Skin: Negative for rash.   Musculoskeletal: Positive for joint pain and joint swelling. Negative for falls, muscle cramps, muscle weakness and myalgias.   Gastrointestinal: Negative for abdominal pain, nausea and vomiting.   Genitourinary: Negative for flank pain.   Neurological: Negative for numbness and seizures.   Psychiatric/Behavioral: Negative for altered mental status.   Allergic/Immunologic: Negative for persistent infections.     Objective:     Vital Signs (Most Recent):  Temp: 98.2 °F (36.8 °C) (01/08/22 0718)  Pulse: 62 (01/08/22 0718)  Resp: 18 (01/08/22 0718)  BP: 119/78 (01/08/22 0718)  SpO2: 95 % (01/08/22 0718) Vital Signs (24h Range):  Temp:  [97.6 °F (36.4 °C)-98.4 °F (36.9 °C)] 98.2 °F (36.8 °C)  Pulse:  [59-65] 62  Resp:  [18-20] 18  SpO2:  [95 %-97 %] 95 %  BP: (109-140)/(59-89) 119/78     Weight: 102.3 kg (225 lb 8.5 oz)  Height: 5' 9.5" (176.5 cm)  Body mass index is 32.83 kg/m².      Intake/Output Summary (Last 24 hours) at 1/8/2022 1020  Last data filed at 1/7/2022 2124  Gross per " 24 hour   Intake 100 ml   Output --   Net 100 ml       General    Nursing note and vitals reviewed.  Constitutional: He is oriented to person, place, and time. He appears well-developed and well-nourished. No distress.   HENT:   Nose: Nose normal.   Eyes: EOM are normal.   Cardiovascular: Regular rhythm.    Pulmonary/Chest: Effort normal.   Abdominal: Soft.   Neurological: He is alert and oriented to person, place, and time.   Psychiatric: His behavior is normal.             Right Hand/Wrist Exam   Right hand exam is normal.      Left Hand/Wrist Exam     Comments:  Left index finger with the redness from the fingertips to about the PIP joint.  It is a little circumferential but mostly the dorsum of the finger.  The area that appeared to have purulence was between the D IP joint PIP joint but after local I&D in the ER there was no purulence and the area has flattened out nicely.  No other purulence or abscess formation noted.            Significant Labs:   BMP:   Recent Labs   Lab 01/07/22  1755   GLU 79      K 3.6      CO2 24   BUN 31*   CREATININE 0.8   CALCIUM 9.4     CBC:   Recent Labs   Lab 01/07/22  1726   WBC 8.26   HGB 15.8   HCT 48.9        Coagulation: No results for input(s): LABPROT, INR, APTT in the last 48 hours.  CRP: No results for input(s): CRP in the last 48 hours.  Wound Culture: No results for input(s): LABAERO in the last 48 hours.  All pertinent labs within the past 24 hours have been reviewed.    Significant Imaging: X-Ray: I have reviewed all pertinent results/findings and my personal findings are:  X-RAYS OF THE LEFT HAND SHOW NO SIGNS of foreign body    Assessment/Plan:     * Cellulitis of left index finger  CONTINUE IV ANTIBIOTICS PER HOSPITALIST RECOMMENDATIONS.  NO SURGERY AT THIS TIME.        Thank you for your consult. I will follow-up with patient. Please contact us if you have any additional questions.    Samuel Nicholson MD  Orthopedics  [facility]  [facility]

## 2022-01-08 NOTE — H&P
[facility] - Emergency Dept  Hospital Medicine  History & Physical    Patient Name: Jacky Greenfield  MRN: [id number]  Patient Class: OP- Observation  Admission Date: 1/7/2022  Attending Physician: Vinnie Reynoso MD  Primary Care Provider: Primary Doctor No         Patient information was obtained from patient, past medical records and ER records.     Subjective:     Principal Problem:Cellulitis of left index finger    Chief Complaint:   Chief Complaint   Patient presents with    FINGER INFECTION     SAT OR SUN LEFT INDEX FINGER INJURY/LAC, SEEN AT [facility] TODAY AND GIVEN A ROCEPHIN AND GIVEN RX FOR DOXY AND BACTROBAN OINTMENT        HPI: 66-year-old pleasant  male with no known significant past medical history presented with left index finger cellulitis.    Reports injuring his left index finger accidentally last weekend when he was cleaning a deer head.  He noticed redness, swelling and pain which was gradually worsening.  He tried Epsom salt soaks and Neosporin ointment no affect.  He attempted drainage and got minimal serosanguineous drainage.  No purulence noted.  He was seen at a local urgent care earlier this morning; was given ceftriaxone and prescription for doxycycline.  He was advised to see orthopedics outpatient.  The orthopedist office reviewed images and advised him to go to the ED.      Rest of the 10 point review of systems is negative except as mentioned above.      Past Medical History:   Diagnosis Date    Cataract     Cervical radiculopathy        Past Surgical History:   Procedure Laterality Date    APPENDECTOMY      EYE SURGERY         Review of patient's allergies indicates:   Allergen Reactions    Gabapentin      Pt states made him sleepy/drowsy    Percocet [oxycodone-acetaminophen] Rash       Current Facility-Administered Medications on File Prior to Encounter   Medication    [COMPLETED] cefTRIAXone injection 1 g     Current Outpatient Medications on File Prior  to Encounter   Medication Sig    acetaminophen (TYLENOL) 325 MG tablet Take 325 mg by mouth every 6 (six) hours as needed for Pain.    doxycycline (MONODOX) 100 MG capsule Take 1 capsule (100 mg total) by mouth 2 (two) times daily.    mupirocin (BACTROBAN) 2 % ointment Apply topically 3 (three) times daily.     Family History     Problem Relation (Age of Onset)    Heart disease Father        Tobacco Use    Smoking status: Never Smoker    Smokeless tobacco: Never Used   Substance and Sexual Activity    Alcohol use: No    Drug use: No    Sexual activity: Yes       Objective:     Vital Signs (Most Recent):  Temp: 97.6 °F (36.4 °C) (01/07/22 1320)  Pulse: 65 (01/07/22 1320)  Resp: 20 (01/07/22 1320)  BP: 137/89 (01/07/22 1320)  SpO2: 97 % (01/07/22 1320) Vital Signs (24h Range):  Temp:  [97.6 °F (36.4 °C)] 97.6 °F (36.4 °C)  Pulse:  [65-68] 65  Resp:  [16-20] 20  SpO2:  [97 %] 97 %  BP: (137-140)/(83-89) 137/89     Weight: 101.6 kg (224 lb)  Body mass index is 33.08 kg/m².    Physical Exam      General: Patient resting comfortably in no acute distress.  Eyes: No pallor or scleral icterus.  Lungs: CTA. Good air entry.  Cor: Regular rate and rhythm. No murmurs.   Musculoskeletal: No arthropathy, deformity.  No wrist tenderness  Skin:  Left index finger swelling noted.  Dressing is clean, dry and intact.  Pictures are reviewed  Neuro: A&O x3. Moving all extremities equally                      Significant Labs:   All pertinent labs within the past 24 hours have been reviewed.  CBC:   Recent Labs   Lab 01/07/22  1726   WBC 8.26   HGB 15.8   HCT 48.9        CMP:   Recent Labs   Lab 01/07/22  1755      K 3.6      CO2 24   GLU 79   BUN 31*   CREATININE 0.8   CALCIUM 9.4   PROT 7.6   ALBUMIN 4.8   BILITOT 1.1*   ALKPHOS 83   AST 24   ALT 31   ANIONGAP 10   EGFRNONAA >60.0       Significant Imaging: I have reviewed all pertinent imaging results/findings within the past 24 hours.     Narrative &  Impression  EXAMINATION:  XR FINGER 2 OR MORE VIEWS LEFT     CLINICAL HISTORY:  Laceration without foreign body of unspecified finger without damage to nail, initial encounter     TECHNIQUE:  Three views left index finger     COMPARISON:  None     FINDINGS:  Diffuse soft tissue swelling is present.  There is no fracture, dislocation, or foreign body.     Impression:     Moderate soft tissue swelling of the left index finger.        Electronically signed by: Chang Borjas MD  Date:                                            01/07/2022  Time:                                           11:18             Exam Ended: 01/07/22 11:13               Assessment/Plan:     Active Hospital Problems    Diagnosis  POA    *Cellulitis of left index finger [L03.012]  Yes      Resolved Hospital Problems   No resolved problems to display.       Plan:  Uncomplicated left index finger cellulitis  Case was discussed with Dr. Nicholson from Orthopedics by ED provider  Abscess was unroofed in the ED with no significant drainage  Continue empiric vancomycin that was initiated in the ED  Also s/p piperacillin-tazobactam in ED.  Change to ceftriaxone  Likely intervention tmrw AM   NPO from midnight   Pain control   Follow-up blood cultures  He received tetanus shot at the urgent care    VTE risk low.  Ambulate as tolerated     Vinnie Reynoso MD  Department of Hospital Medicine   [facility] - Emergency Dept

## 2022-01-08 NOTE — SUBJECTIVE & OBJECTIVE
Past Medical History:   Diagnosis Date    Cataract     Cervical radiculopathy        Past Surgical History:   Procedure Laterality Date    APPENDECTOMY      EYE SURGERY         Review of patient's allergies indicates:   Allergen Reactions    Gabapentin      Pt states made him sleepy/drowsy    Percocet [oxycodone-acetaminophen] Rash       Current Facility-Administered Medications on File Prior to Encounter   Medication    [COMPLETED] cefTRIAXone injection 1 g     Current Outpatient Medications on File Prior to Encounter   Medication Sig    acetaminophen (TYLENOL) 325 MG tablet Take 325 mg by mouth every 6 (six) hours as needed for Pain.    doxycycline (MONODOX) 100 MG capsule Take 1 capsule (100 mg total) by mouth 2 (two) times daily.    mupirocin (BACTROBAN) 2 % ointment Apply topically 3 (three) times daily.     Family History     Problem Relation (Age of Onset)    Heart disease Father        Tobacco Use    Smoking status: Never Smoker    Smokeless tobacco: Never Used   Substance and Sexual Activity    Alcohol use: No    Drug use: No    Sexual activity: Yes       Objective:     Vital Signs (Most Recent):  Temp: 97.6 °F (36.4 °C) (01/07/22 1320)  Pulse: 65 (01/07/22 1320)  Resp: 20 (01/07/22 1320)  BP: 137/89 (01/07/22 1320)  SpO2: 97 % (01/07/22 1320) Vital Signs (24h Range):  Temp:  [97.6 °F (36.4 °C)] 97.6 °F (36.4 °C)  Pulse:  [65-68] 65  Resp:  [16-20] 20  SpO2:  [97 %] 97 %  BP: (137-140)/(83-89) 137/89     Weight: 101.6 kg (224 lb)  Body mass index is 33.08 kg/m².    Physical Exam      General: Patient resting comfortably in no acute distress.  Eyes: No pallor or scleral icterus.  Lungs: CTA. Good air entry.  Cor: Regular rate and rhythm. No murmurs.   Musculoskeletal: No arthropathy, deformity.  No wrist tenderness  Skin:  Left index finger swelling noted.  Dressing is clean, dry and intact.  Pictures are reviewed  Neuro: A&O x3. Moving all extremities equally                      Significant  Labs:   All pertinent labs within the past 24 hours have been reviewed.  CBC:   Recent Labs   Lab 01/07/22  1726   WBC 8.26   HGB 15.8   HCT 48.9        CMP:   Recent Labs   Lab 01/07/22  1755      K 3.6      CO2 24   GLU 79   BUN 31*   CREATININE 0.8   CALCIUM 9.4   PROT 7.6   ALBUMIN 4.8   BILITOT 1.1*   ALKPHOS 83   AST 24   ALT 31   ANIONGAP 10   EGFRNONAA >60.0       Significant Imaging: I have reviewed all pertinent imaging results/findings within the past 24 hours.     Narrative & Impression  EXAMINATION:  XR FINGER 2 OR MORE VIEWS LEFT     CLINICAL HISTORY:  Laceration without foreign body of unspecified finger without damage to nail, initial encounter     TECHNIQUE:  Three views left index finger     COMPARISON:  None     FINDINGS:  Diffuse soft tissue swelling is present.  There is no fracture, dislocation, or foreign body.     Impression:     Moderate soft tissue swelling of the left index finger.        Electronically signed by: Chang Borjas MD  Date:                                            01/07/2022  Time:                                           11:18             Exam Ended: 01/07/22 11:13

## 2022-01-08 NOTE — PROGRESS NOTES
Pharmacokinetic Initial Assessment: IV Vancomycin    Assessment/Plan:    Initiate intravenous vancomycin with loading dose of 2000 mg once followed by a maintenance dose of vancomycin 1750 mg IV every 12 hours  Desired empiric serum trough concentration is 10 to 15 mcg/mL  Draw vancomycin trough level 60 min prior to fourth dose on 01/09 at approximately 1300  Pharmacy will continue to follow and monitor vancomycin.      Please contact pharmacy at extension [phone] with any questions regarding this assessment.     Thank you for the consult,   Morales Jordan       Patient brief summary:  Jacky Greenfield is a 66 y.o. male initiated on antimicrobial therapy with IV Vancomycin for treatment of suspected skin & soft tissue infection    Drug Allergies:   Review of patient's allergies indicates:   Allergen Reactions    Gabapentin      Pt states made him sleepy/drowsy    Percocet [oxycodone-acetaminophen] Rash       Actual Body Weight:   102.3 kg    Renal Function:   Estimated Creatinine Clearance: 108 mL/min (based on SCr of 0.8 mg/dL).,     CBC (last 72 hours):  Recent Labs   Lab Result Units 01/07/22  1726   WBC K/uL 8.26   Hemoglobin g/dL 15.8   Hematocrit % 48.9   Platelets K/uL 228   Gran % % 72.2   Lymph % % 16.7*   Mono % % 9.2   Eosinophil % % 1.3   Basophil % % 0.4   Differential Method  Automated       Metabolic Panel (last 72 hours):  Recent Labs   Lab Result Units 01/07/22  1755   Sodium mmol/L 139   Potassium mmol/L 3.6   Chloride mmol/L 105   CO2 mmol/L 24   Glucose mg/dL 79   BUN mg/dL 31*   Creatinine mg/dL 0.8   Albumin g/dL 4.8   Total Bilirubin mg/dL 1.1*   Alkaline Phosphatase U/L 83   AST U/L 24   ALT U/L 31       Drug levels (last 3 results):  No results for input(s): VANCOMYCINRA, VANCOMYCINPE, VANCOMYCINTR in the last 72 hours.    Microbiologic Results:  Microbiology Results (last 7 days)       Procedure Component Value Units Date/Time    Blood culture #2 **CANNOT BE ORDERED STAT** [[id number]] Collected:  01/07/22 1759    Order Status: Sent Specimen: Blood from Peripheral, Wrist, Right Updated: 01/07/22 1810    Blood culture #1 **CANNOT BE ORDERED STAT** [[id number]] Collected: 01/07/22 1726    Order Status: Sent Specimen: Blood from Peripheral, Forearm, Right Updated: 01/07/22 173

## 2022-01-08 NOTE — HPI
66-year-old pleasant  male with no known significant past medical history presented with left index finger cellulitis.     Reports injuring his left index finger accidentally last weekend when he was cleaning a deer head.  He noticed redness, swelling and pain which was gradually worsening.  He tried Epsom salt soaks and Neosporin ointment no affect.  He attempted drainage and got minimal serosanguineous drainage.  No purulence noted.  He was seen at a local urgent care earlier this morning; was given ceftriaxone and prescription for doxycycline.  He was advised to see orthopedics outpatient.  The orthopedist office reviewed images and advised him to go to the ED.

## 2022-01-08 NOTE — PLAN OF CARE
[facility]  Initial Discharge Assessment       Primary Care Provider: Primary Doctor No    Admission Diagnosis: Cellulitis of left index finger [L03.012]    Admission Date: 1/7/2022  Expected Discharge Date:      Discharge Barriers Identified: (P) None    Assessment completed with patient who reports that he plans to discharge home with family. He reports being independent with all ADLs prior to admission. No discharge needs identified.     Payor: [employer] / Plan: HU[employer] POS / Product Type: PPO /     Extended Emergency Contact Information  Primary Emergency Contact: Yogesh Greenfield  Address: [address]           [place] [address] [address]  Home Phone: [phone]  Relation: Spouse    Discharge Plan A: (P) Home with family  Discharge Plan B: (P) Home with family      [id number] - [place] - [address]  [address]  [place] [address]  Phone: [phone] Fax: [phone]      Initial Assessment (most recent)       Adult Discharge Assessment - 01/08/22 1140          Discharge Assessment    Assessment Type Discharge Planning Assessment     Confirmed/corrected address, phone number and insurance Yes (P)      Confirmed Demographics Correct on Facesheet (P)      Source of Information patient     When was your last doctors appointment? 12/09/21     Reason For Admission Cellulitis of left index finger (P)      Lives With spouse     Do you expect to return to your current living situation? Yes (P)      Do you have help at home or someone to help you manage your care at home? -- (P)    N/A    Prior to hospitilization cognitive status: Alert/Oriented;No Deficits     Current cognitive status: No Deficits;Alert/Oriented     Walking or Climbing Stairs Difficulty none     Dressing/Bathing Difficulty none     Do you have any problems with: -- (P)    N/A    Equipment Currently Used at Home none     Readmission within 30 days? Yes     Do you take prescription  medications? No     Who is going to help you get home at discharge? Wife Yogesh Greenfield (P)      How do you get to doctors appointments? car, drives self     Are you on dialysis? No     Do you take coumadin? No     Discharge Plan A Home with family (P)      Discharge Plan B Home with family (P)      DME Needed Upon Discharge  none     Discharge Plan discussed with: Patient (P)      Discharge Barriers Identified None (P)         Relationship/Environment    Name(s) of Who Lives With Patient Wife Yogesh (P)

## 2022-01-08 NOTE — PROGRESS NOTES
"[facility] Medicine Progress Note  Patient Name: Jacky Greenfield MRN: [id number]   Patient Class: OP- Observation  Length of Stay: 0   Admission Date: 1/7/2022  1:16 PM Attending Physician: Rico Muñoz MD   Primary Care Provider: Primary Doctor No Face-to-Face encounter date: 01/08/2022   Chief Complaint: FINGER INFECTION (SAT OR SUN LEFT INDEX FINGER INJURY/LAC, SEEN AT [place] TODAY AND GIVEN A ROCEPHIN AND GIVEN RX FOR DOXY AND BACTROBAN OINTMENT)    Assessment & Plan:   Jacky Greenfield is a 66 y.o. male admitted for    Active Problems/Assessment & Plan  Left index cellulitis    Plan  Continue IV vancomycin and Ceftriaxone  Ortho not planning any surgical intervention  Continue pain management   Discharge tomorrow     Subjective:    Interval History   Patient is doing fairly well.  Pain better    Denies chest pain, shortness of breath, palpitations, abdominal pain, nausea/vomiting.   No concerns/issues overnight reported by the patient or the nursing staff.  Reviewed the labs and discussed the plan of care.   No family present at bedside.     Review of Systems   All other Review of Systems were found to be negative expect for that mentioned already in HPI.   Objective:   Physical Exam  /77   Pulse 68   Temp 97.9 °F (36.6 °C)   Resp 18   Ht 5' 9.5" (1.765 m)   Wt 102.3 kg (225 lb 8.5 oz)   SpO2 (!) 94%   BMI 32.83 kg/m²   Vitals reviewed.    Constitutional: No distress.   HENT: Atraumatic.   Cardiovascular: Normal rate, regular rhythm and normal heart sounds.   Pulmonary/Chest: Effort normal. Clear to auscultation bilaterally. No wheezes.   Abdominal: Soft. Bowel sounds are normal. Exhibits no distension and no mass. No tenderness  Neurological: Alert.   Skin: Skin is warm and dry.     Following labs were Reviewed   Recent Labs   Lab 01/07/22  1726 01/07/22  1755   WBC 8.26  --    HGB 15.8  --    HCT 48.9  --      --    CALCIUM  --  9.4   ALBUMIN  --  4.8   PROT  " --  7.6   NA  --  139   K  --  3.6   CO2  --  24   CL  --  105   BUN  --  31*   CREATININE  --  0.8   ALKPHOS  --  83   ALT  --  31   AST  --  24   BILITOT  --  1.1*     No results found for: POCTGLUCOSE     All labs within the past 24 hours have been reviewed  Microbiology Results (last 7 days)     Procedure Component Value Units Date/Time    Blood culture #1 **CANNOT BE ORDERED STAT** [[id number]] Collected: 01/07/22 1726    Order Status: Completed Specimen: Blood from Peripheral, Forearm, Right Updated: 01/08/22 0158     Blood Culture, Routine No Growth to date    Blood culture #2 **CANNOT BE ORDERED STAT** [[id number]] Collected: 01/07/22 1759    Order Status: Completed Specimen: Blood from Peripheral, Wrist, Right Updated: 01/08/22 0158     Blood Culture, Routine No Growth to date        No orders to display       Inpatient medications  Scheduled Meds:   cefTRIAXone (ROCEPHIN) IVPB  1 g Intravenous Q24H    vancomycin (VANCOCIN) IVPB  1,750 mg Intravenous Q12H     Continuous Infusions:  PRN Meds:.ibuprofen, melatonin, ondansetron, polyethylene glycol, Pharmacy to dose Vancomycin consult **AND** vancomycin - pharmacy to dose    Above encounter included review of the medical records, interviewing and examining the patient face-to-face, discussion with family and other health care providers, ordering and interpreting lab/test results and formulating a plan of care.     Medical Decision Making:    [] Low Complexity  [] Moderate Complexity  [x] High Complexity    Rico Muñoz MD  [facility] Hospitalist  01/08/2022

## 2022-01-09 VITALS
WEIGHT: 225.5 LBS | HEART RATE: 55 BPM | DIASTOLIC BLOOD PRESSURE: 79 MMHG | BODY MASS INDEX: 32.28 KG/M2 | RESPIRATION RATE: 18 BRPM | HEIGHT: 70 IN | TEMPERATURE: 98 F | SYSTOLIC BLOOD PRESSURE: 136 MMHG | OXYGEN SATURATION: 96 %

## 2022-01-09 PROCEDURE — 96366 THER/PROPH/DIAG IV INF ADDON: CPT | Mod: 59

## 2022-01-09 PROCEDURE — G0378 HOSPITAL OBSERVATION PER HR: HCPCS

## 2022-01-09 PROCEDURE — 63600175 PHARM REV CODE 636 W HCPCS: Performed by: INTERNAL MEDICINE

## 2022-01-09 PROCEDURE — 25000003 PHARM REV CODE 250: Performed by: INTERNAL MEDICINE

## 2022-01-09 RX ORDER — SULFAMETHOXAZOLE AND TRIMETHOPRIM 800; 160 MG/1; MG/1
2 TABLET ORAL 2 TIMES DAILY
Qty: 28 TABLET | Refills: 0 | Status: SHIPPED | OUTPATIENT
Start: 2022-01-09 | End: 2022-01-16

## 2022-01-09 RX ORDER — CEFADROXIL 1000 MG/1
1 TABLET ORAL 2 TIMES DAILY
Qty: 14 TABLET | Refills: 0 | Status: SHIPPED | OUTPATIENT
Start: 2022-01-09 | End: 2022-01-16

## 2022-01-09 RX ADMIN — VANCOMYCIN HYDROCHLORIDE 1750 MG: 500 INJECTION, POWDER, LYOPHILIZED, FOR SOLUTION INTRAVENOUS at 03:01

## 2022-01-09 NOTE — DISCHARGE INSTRUCTIONS
Diet:  Resume regular diet/    Physical Activity:  Activity as tolerated    Instructions:  1. Take all medications as prescribed  2. Keep all follow-up appointments  3. Return to the hospital or call your primary care physicians if any worsening symptoms such as abscess, more painful occur.      Follow-Up Appointments:  1. Please call your primary care physician to schedule an appointment in 1 week time.    Pending laboratory work/Tests to be performed/followed by the Primary care Physician: none

## 2022-01-09 NOTE — DISCHARGE SUMMARY
"[facility]  Discharge Summary  Patient Name: Jacky Greenfield MRN: [id number]   Patient Class: OP- Observation  Length of Stay: 0   Admission Date: 1/7/2022  1:16 PM Attending Physician: Rico Muñoz MD   Primary Care Provider: Primary Doctor No Face-to-Face encounter date: 01/09/2022   Chief Complaint: FINGER INFECTION (SAT OR SUN LEFT INDEX FINGER INJURY/LAC, SEEN AT [place] TODAY AND GIVEN A ROCEPHIN AND GIVEN RX FOR DOXY AND BACTROBAN OINTMENT)    Date of Discharge: 1/9/2022  Discharge Disposition: Home or Self Care  Condition: Stable       Reason for Hospitalization   Left index finger cellulitis       Patient Active Problem List   Diagnosis    Cellulitis of left index finger       Brief History of Present Illness    Jacky Greenfield is a 66 y.o.  male who  has a past medical history of Cataract and Cervical radiculopathy.. The patient presented to [facility] on 1/7/2022 with a primary complaint of FINGER INFECTION (SAT OR SUN LEFT INDEX FINGER INJURY/LAC, SEEN AT [place] TODAY AND GIVEN A ROCEPHIN AND GIVEN RX FOR DOXY AND BACTROBAN OINTMENT)  .     For the full HPI please refer to the History & Physical from this admission.    Hospital Course By Problem with Pertinent Findings     Admitted for left index finger cellulitis. Orthopedic evaluated the patient and recommended medical management. He was given 2 days of IV Vancomycin and IV Ceftriaxone. Discharged on 7 days of Bactrim DS and Cefadroxil.       Patient was seen and examined on the date of discharge and determined to be suitable for discharge.    Physical Exam  /79   Pulse (!) 55   Temp 97.6 °F (36.4 °C) (Oral)   Resp 18   Ht 5' 9.5" (1.765 m)   Wt 102.3 kg (225 lb 8.5 oz)   SpO2 96%   BMI 32.83 kg/m²   Vitals reviewed.    Constitutional: No distress.   HENT: Atraumatic.   Cardiovascular: Normal rate, regular rhythm and normal heart sounds.   Pulmonary/Chest: Effort normal. Clear to auscultation " bilaterally. No wheezes.   Abdominal: Soft. Bowel sounds are normal. Exhibits no distension and no mass. No tenderness  Neurological: Alert.   Skin: Skin is warm and dry.     Following labs were Reviewed   No results for input(s): WBC, HGB, HCT, PLT, GLUCOSE, CALCIUM, ALBUMIN, PROT, NA, K, CO2, CL, BUN, CREATININE, ALKPHOS, ALT, AST, BILITOT in the last 24 hours.  No results found for: POCTGLUCOSE     All labs within the past 24 hours have been reviewed    Microbiology Results (last 7 days)     Procedure Component Value Units Date/Time    Blood culture #1 **CANNOT BE ORDERED STAT** [[id number]] Collected: 01/07/22 1726    Order Status: Completed Specimen: Blood from Peripheral, Forearm, Right Updated: 01/08/22 2032     Blood Culture, Routine No Growth to date      No Growth to date    Blood culture #2 **CANNOT BE ORDERED STAT** [[id number]] Collected: 01/07/22 1759    Order Status: Completed Specimen: Blood from Peripheral, Wrist, Right Updated: 01/08/22 2032     Blood Culture, Routine No Growth to date      No Growth to date        No orders to display       No results found for this or any previous visit.      Consultants and Procedures   Consultants:  Orthopedics    Procedures:   none    Discharge Information:   Diet:  Resume regular diet/    Physical Activity:  Activity as tolerated    Instructions:  1. Take all medications as prescribed  2. Keep all follow-up appointments  3. Return to the hospital or call your primary care physicians if any worsening symptoms such as abscess, more painful occur.      Follow-Up Appointments:  1. Please call your primary care physician to schedule an appointment in 1 week time.    Pending laboratory work/Tests to be performed/followed by the Primary care Physician: none    The patient was discharged in the care of her parents//wife/family/caregiver, with discharge instructions were reviewed in written and verbal form. All pertinent questions were discussed and prescriptions  were provided. The importance of making follow up appointments and compliance of medications has been stressed repeatedly. The patient will follow up in 1 week or sooner as needed with the PCP, and the patient is on board with the plan. Upon discharge, patient needs to be on following medications.    Discharge Medications:     Medication List      START taking these medications    cefadroxil 1 gram tablet  Commonly known as: DURICEF  Take 1 tablet (1 g total) by mouth 2 (two) times daily. for 7 days     sulfamethoxazole-trimethoprim 800-160mg 800-160 mg Tab  Commonly known as: BACTRIM DS  Take 2 tablets by mouth 2 (two) times daily. for 7 days        CONTINUE taking these medications    acetaminophen 325 MG tablet  Commonly known as: TYLENOL        STOP taking these medications    doxycycline 100 MG capsule  Commonly known as: MONODOX     mupirocin 2 % ointment  Commonly known as: BACTROBAN           Where to Get Your Medications      Information about where to get these medications is not yet available    Ask your nurse or doctor about these medications  · cefadroxil 1 gram tablet  · sulfamethoxazole-trimethoprim 800-160mg 800-160 mg Tab           I spent 30 minutes preparing the discharge including reviewing records from previous encounters, preparation of discharge summary, assessing and final examination of the patient, discharge medicine reconciliation, discussing plan of care, follow up and education and prescriptions.       Rico Muñoz  [facility] Hospitalist  01/09/2022

## 2022-01-09 NOTE — NURSING
Patient discharged from unit today. Discharge instructions reviewed with patient. ABT Rx given to patient, patient instructed to complete all antibiotics and encouraged to drink water. Patient verbalized understanding. No complaints or concerns voiced. IV removed, cath intact. Gauze dressing applied. Patient is alert and able to ambulate independently. No acute distress noted.

## 2022-01-10 NOTE — PLAN OF CARE
01/09/22 2307   Final Note   Assessment Type Final Discharge Note   Anticipated Discharge Disposition Home   What phone number can be called within the next 1-3 days to see how you are doing after discharge? [phone]       Discharge orders reviewed. No case management/discharge planning needs noted.

## 2022-01-11 ENCOUNTER — HOSPITAL ENCOUNTER (EMERGENCY)
Facility: HOSPITAL | Age: 67
Discharge: HOME | End: 2022-01-11
Attending: EMERGENCY MEDICINE
Payer: COMMERCIAL

## 2022-01-11 VITALS
RESPIRATION RATE: 18 BRPM | WEIGHT: 219 LBS | DIASTOLIC BLOOD PRESSURE: 87 MMHG | TEMPERATURE: 98 F | HEART RATE: 70 BPM | BODY MASS INDEX: 31.35 KG/M2 | HEIGHT: 70 IN | SYSTOLIC BLOOD PRESSURE: 142 MMHG | OXYGEN SATURATION: 97 %

## 2022-01-11 DIAGNOSIS — I82.611 ACUTE THROMBOSIS OF RIGHT CEPHALIC VEIN: Primary | ICD-10-CM

## 2022-01-11 PROCEDURE — 99284 EMERGENCY DEPT VISIT MOD MDM: CPT | Mod: 25

## 2022-01-11 NOTE — ED PROVIDER NOTES
Encounter Date: 1/11/2022       History     Chief Complaint   Patient presents with    Arm Problem     RECENT IV TO RT ARM, NOW RED AND PAINFUL     66-year-old male presents the ER for evaluation of right arm swelling and pain.  Patient states that he was recently admitted and discharged for cellulitis of the left index finger.  He had IV access to the right forearm.  Patient states that he had an ultrasound-guided IV during admission.  Patient states that he was discharged on Sunday and noticed swelling to the right arm on Monday.  He states that the swelling and redness has significantly worsened today.  Denies any paresthesia focal weakness extremity.  Patient states that he is currently on 2 different oral antibiotics.  No fevers or chills at home.  No prior history of DVT or PE.  No shortness of breath or chest pain.  He does not use any anticoagulants.    The history is provided by the patient.     Review of patient's allergies indicates:   Allergen Reactions    Gabapentin      Pt states made him sleepy/drowsy    Percocet [oxycodone-acetaminophen] Rash     Past Medical History:   Diagnosis Date    Cataract     Cervical radiculopathy      Past Surgical History:   Procedure Laterality Date    APPENDECTOMY      EYE SURGERY      SHOULDER SURGERY  09/24/2021    right shoulder surgery, rotator cuff, Dr. Garcia     Family History   Problem Relation Age of Onset    Heart disease Father      Social History     Tobacco Use    Smoking status: Never Smoker    Smokeless tobacco: Never Used   Substance Use Topics    Alcohol use: No    Drug use: No     Review of Systems   Constitutional: Negative for chills and fever.   Eyes: Negative for visual disturbance.   Respiratory: Negative for shortness of breath.    Cardiovascular: Negative for chest pain.   Gastrointestinal: Negative for nausea and vomiting.   Genitourinary: Negative for dysuria and flank pain.   Musculoskeletal: Positive for myalgias.   Skin:  Positive for color change. Negative for rash and wound.   Allergic/Immunologic: Negative for immunocompromised state.   Neurological: Negative for weakness and numbness.   Hematological: Does not bruise/bleed easily.   Psychiatric/Behavioral: Negative for confusion.       Physical Exam     Initial Vitals   BP Pulse Resp Temp SpO2   01/11/22 1722 01/11/22 1735 01/11/22 1722 01/11/22 1722 01/11/22 1722   (!) 142/87 70 18 98.3 °F (36.8 °C) 97 %      MAP       --                Physical Exam    Vitals reviewed.  Constitutional: He appears well-developed and well-nourished. He is not diaphoretic. No distress.   HENT:   Head: Normocephalic and atraumatic.   Eyes: Conjunctivae and EOM are normal.   Neck: Neck supple.   Pulmonary/Chest: No respiratory distress.   Musculoskeletal:         General: Normal range of motion.      Right forearm: Swelling (With associated erythema noted to the right ventral forearm with puncture wound noted.) and tenderness present. No bony tenderness.      Cervical back: Neck supple.     Neurological: He is alert and oriented to person, place, and time.         ED Course   Procedures  Labs Reviewed - No data to display       Imaging Results          US Upper Extremity Veins Right (Final result)  Result time 01/11/22 18:37:36    Final result by Jeb Carrington MD (01/11/22 18:37:36)                 Narrative:    VENOUS DOPPLER ULTRASOUND RIGHT UPPER EXTREMITY    CLINICAL DATA: Erythema, pain right forearm. Recent IV placement right upper extremity    FINDINGS: Color and duplex Doppler sonographic assessment with spectral analysis of the right upper extremity demonstrates occlusive thrombus within the cephalic vein at the proximal and mid forearm. The basilic vein is patent at all levels.    The brachial, radial, and ulnar veins are within normal limits. The internal jugular, subclavian, and axillary veins are within normal limits.    IMPRESSION:  1. Occlusive thrombus within the cephalic vein  at the right forearm.  2. No evidence of thrombus formation within the deep venous system.    Electronically signed by:  Jeb Carrington MD  1/11/2022 6:37 PM CST Workstation: [id number]                               Medications - No data to display        APC / Resident Notes:   Patient seen in the ER promptly upon arrival.  He is afebrile, no acute distress.  Physical examination reveals erythema noted to the right ventral forearm with associated puncture wound.  Tender on palpation.  Range of motion of the wrist and elbows fully intact.    Given recent IV access , suspicious for thrombophlebitis versus DVT versus hematoma.    Will obtain ultrasound of the arm.    Ultrasound reveals occlusive thrombus within the cephalic vein at the right forearm.  No evidence of DVT otherwise.  Patient advised to use heat pack and compression to the site.  Recommended aspirin use for the next 2 weeks.  This is likely self-limiting.  Advised to follow up with his family doctor in the next week.  He was however given strict return precautions ED which was agreeable to.  Patient is otherwise stable for discharge and close follow-up at this time.    Disclaimer: This note has been generated using voice-recognition software. There may be typographical errors that have been missed during proof-reading.                   Clinical Impression:   Final diagnoses:  [I82.611] Acute thrombosis of right cephalic vein (Primary)          ED Disposition Condition    Discharge Stable        ED Prescriptions     None        Follow-up Information    None          Hannah Bear PA-C  01/11/22 0660

## 2022-01-12 LAB
BACTERIA BLD CULT: NORMAL
BACTERIA BLD CULT: NORMAL

## 2022-01-12 NOTE — DISCHARGE INSTRUCTIONS
Use ice or heat pack to the site. Use compression to the arm. You may take full dose Asprin for the next 2 weeks. Follow up with your family doctor. This is resolve on its own.

## 2022-06-03 ENCOUNTER — OFFICE VISIT (OUTPATIENT)
Dept: FAMILY MEDICINE | Facility: CLINIC | Age: 67
End: 2022-06-03
Payer: COMMERCIAL

## 2022-06-03 VITALS
HEART RATE: 67 BPM | OXYGEN SATURATION: 97 % | BODY MASS INDEX: 32.02 KG/M2 | TEMPERATURE: 98 F | DIASTOLIC BLOOD PRESSURE: 82 MMHG | WEIGHT: 223.63 LBS | SYSTOLIC BLOOD PRESSURE: 128 MMHG | RESPIRATION RATE: 18 BRPM | HEIGHT: 70 IN

## 2022-06-03 DIAGNOSIS — Z23 NEED FOR PROPHYLACTIC VACCINATION AGAINST STREPTOCOCCUS PNEUMONIAE (PNEUMOCOCCUS): ICD-10-CM

## 2022-06-03 DIAGNOSIS — Z00.01 ENCOUNTER FOR PREVENTATIVE ADULT HEALTH CARE EXAM WITH ABNORMAL FINDINGS: Primary | ICD-10-CM

## 2022-06-03 DIAGNOSIS — Z12.5 PROSTATE CANCER SCREENING: ICD-10-CM

## 2022-06-03 DIAGNOSIS — Z11.59 NEED FOR HEPATITIS C SCREENING TEST: ICD-10-CM

## 2022-06-03 DIAGNOSIS — E66.9 CLASS 1 OBESITY WITH BODY MASS INDEX (BMI) OF 32.0 TO 32.9 IN ADULT, UNSPECIFIED OBESITY TYPE, UNSPECIFIED WHETHER SERIOUS COMORBIDITY PRESENT: ICD-10-CM

## 2022-06-03 PROBLEM — M54.12 CERVICAL RADICULOPATHY: Status: ACTIVE | Noted: 2020-02-17

## 2022-06-03 PROCEDURE — 1101F PR PT FALLS ASSESS DOC 0-1 FALLS W/OUT INJ PAST YR: ICD-10-PCS | Mod: CPTII,S$GLB,, | Performed by: INTERNAL MEDICINE

## 2022-06-03 PROCEDURE — 3008F BODY MASS INDEX DOCD: CPT | Mod: CPTII,S$GLB,, | Performed by: INTERNAL MEDICINE

## 2022-06-03 PROCEDURE — 1160F PR REVIEW ALL MEDS BY PRESCRIBER/CLIN PHARMACIST DOCUMENTED: ICD-10-PCS | Mod: CPTII,S$GLB,, | Performed by: INTERNAL MEDICINE

## 2022-06-03 PROCEDURE — 90677 PCV20 VACCINE IM: CPT | Mod: S$GLB,,, | Performed by: INTERNAL MEDICINE

## 2022-06-03 PROCEDURE — 1126F AMNT PAIN NOTED NONE PRSNT: CPT | Mod: CPTII,S$GLB,, | Performed by: INTERNAL MEDICINE

## 2022-06-03 PROCEDURE — 99387 PR PREVENTIVE VISIT,NEW,65 & OVER: ICD-10-PCS | Mod: 25,S$GLB,, | Performed by: INTERNAL MEDICINE

## 2022-06-03 PROCEDURE — 90471 IMMUNIZATION ADMIN: CPT | Mod: S$GLB,,, | Performed by: INTERNAL MEDICINE

## 2022-06-03 PROCEDURE — 3288F FALL RISK ASSESSMENT DOCD: CPT | Mod: CPTII,S$GLB,, | Performed by: INTERNAL MEDICINE

## 2022-06-03 PROCEDURE — 90677 PNEUMOCOCCAL CONJUGATE VACCINE 20-VALENT: ICD-10-PCS | Mod: S$GLB,,, | Performed by: INTERNAL MEDICINE

## 2022-06-03 PROCEDURE — 3008F PR BODY MASS INDEX (BMI) DOCUMENTED: ICD-10-PCS | Mod: CPTII,S$GLB,, | Performed by: INTERNAL MEDICINE

## 2022-06-03 PROCEDURE — 1126F PR PAIN SEVERITY QUANTIFIED, NO PAIN PRESENT: ICD-10-PCS | Mod: CPTII,S$GLB,, | Performed by: INTERNAL MEDICINE

## 2022-06-03 PROCEDURE — 1101F PT FALLS ASSESS-DOCD LE1/YR: CPT | Mod: CPTII,S$GLB,, | Performed by: INTERNAL MEDICINE

## 2022-06-03 PROCEDURE — 1159F PR MEDICATION LIST DOCUMENTED IN MEDICAL RECORD: ICD-10-PCS | Mod: CPTII,S$GLB,, | Performed by: INTERNAL MEDICINE

## 2022-06-03 PROCEDURE — 1160F RVW MEDS BY RX/DR IN RCRD: CPT | Mod: CPTII,S$GLB,, | Performed by: INTERNAL MEDICINE

## 2022-06-03 PROCEDURE — 1159F MED LIST DOCD IN RCRD: CPT | Mod: CPTII,S$GLB,, | Performed by: INTERNAL MEDICINE

## 2022-06-03 PROCEDURE — 3288F PR FALLS RISK ASSESSMENT DOCUMENTED: ICD-10-PCS | Mod: CPTII,S$GLB,, | Performed by: INTERNAL MEDICINE

## 2022-06-03 PROCEDURE — 90471 PNEUMOCOCCAL CONJUGATE VACCINE 20-VALENT: ICD-10-PCS | Mod: S$GLB,,, | Performed by: INTERNAL MEDICINE

## 2022-06-03 PROCEDURE — 99387 INIT PM E/M NEW PAT 65+ YRS: CPT | Mod: 25,S$GLB,, | Performed by: INTERNAL MEDICINE

## 2022-06-03 NOTE — PROGRESS NOTES
Subjective:       Patient ID: Jacky Greenfield is a 66 y.o. male.    Chief Complaint: Establish Care and Annual Exam    Here to establish care for routine check up.  No chronic medical problems and only on OTC meds as needed.    Review of Systems   Constitutional: Negative.  Negative for activity change, appetite change, chills, diaphoresis, fatigue, fever and unexpected weight change.   HENT: Negative.  Negative for congestion, ear discharge, ear pain, hearing loss, nosebleeds, postnasal drip, rhinorrhea, sinus pressure, sinus pain, sneezing, sore throat, tinnitus, trouble swallowing and voice change.    Eyes: Negative.  Negative for photophobia, pain, discharge, redness, itching and visual disturbance.   Respiratory: Negative.  Negative for apnea, cough, choking, chest tightness, shortness of breath and wheezing.    Cardiovascular: Negative.  Negative for chest pain, palpitations and leg swelling.   Gastrointestinal: Negative.  Negative for abdominal distention, abdominal pain, blood in stool, constipation, diarrhea, nausea and vomiting.   Endocrine: Negative.  Negative for cold intolerance, heat intolerance, polydipsia and polyuria.   Genitourinary: Negative.  Negative for decreased urine volume, difficulty urinating, dysuria, enuresis, flank pain, frequency, genital sores, hematuria, penile discharge, penile pain, scrotal swelling, testicular pain and urgency.   Musculoskeletal: Negative.  Negative for arthralgias, back pain, gait problem, joint swelling, myalgias, neck pain and neck stiffness.   Skin: Positive for wound (Possible chigger bites on both forearms). Negative for rash.   Allergic/Immunologic: Negative.  Negative for environmental allergies, food allergies and immunocompromised state.   Neurological: Negative.  Negative for dizziness, tremors, seizures, syncope, facial asymmetry, speech difficulty, weakness, light-headedness, numbness and headaches.   Hematological: Negative.  Negative for  "adenopathy. Does not bruise/bleed easily.   Psychiatric/Behavioral: Negative.  Negative for confusion, decreased concentration, hallucinations, self-injury, sleep disturbance and suicidal ideas. The patient is not nervous/anxious.        Past Medical History:   Diagnosis Date    Cataract     Cervical radiculopathy       Past Surgical History:   Procedure Laterality Date    APPENDECTOMY      CATARACT EXTRACTION, BILATERAL      SHOULDER SURGERY  09/24/2021    right shoulder surgery, rotator cuff, Dr. Garcia       Family History   Problem Relation Age of Onset    Hypertension Mother     Dementia Mother     Heart disease Father         late 50s    COPD Father     Hypertension Sister     Hypertension Brother     Hypertension Brother     Colon cancer Neg Hx     Prostate cancer Neg Hx        Social History     Socioeconomic History    Marital status:    Tobacco Use    Smoking status: Never Smoker    Smokeless tobacco: Never Used   Substance and Sexual Activity    Alcohol use: No    Drug use: No    Sexual activity: Yes     Partners: Female     Comment:        Current Outpatient Medications   Medication Sig Dispense Refill    acetaminophen (TYLENOL) 325 MG tablet Take 325 mg by mouth every 6 (six) hours as needed for Pain.       No current facility-administered medications for this visit.       Review of patient's allergies indicates:   Allergen Reactions    Gabapentin      Pt states made him sleepy/drowsy    Percocet [oxycodone-acetaminophen] Rash     Objective:      Blood pressure 128/82, pulse 67, temperature 97.7 °F (36.5 °C), resp. rate 18, height 5' 9.5" (1.765 m), weight 101.4 kg (223 lb 9.6 oz), SpO2 97 %. Body mass index is 32.55 kg/m².   Physical Exam  Vitals and nursing note reviewed.   Constitutional:       General: He is not in acute distress.     Appearance: Normal appearance. He is well-developed. He is not ill-appearing, toxic-appearing or diaphoretic.   HENT:      Head: " Normocephalic and atraumatic.      Right Ear: Hearing, tympanic membrane, ear canal and external ear normal.      Left Ear: Hearing, tympanic membrane, ear canal and external ear normal.      Nose: Nose normal.      Mouth/Throat:      Pharynx: Uvula midline.   Eyes:      General: Lids are normal. No scleral icterus.        Right eye: No discharge.         Left eye: No discharge.      Conjunctiva/sclera: Conjunctivae normal.      Right eye: Right conjunctiva is not injected. No hemorrhage.     Left eye: Left conjunctiva is not injected. No hemorrhage.     Pupils: Pupils are unequal (left pupil larger and irregular shaped).      Right eye: Pupil is not reactive.      Left eye: Pupil is not round and not reactive.   Neck:      Thyroid: No thyromegaly.      Vascular: No carotid bruit.   Cardiovascular:      Rate and Rhythm: Normal rate and regular rhythm.      Pulses:           Dorsalis pedis pulses are 2+ on the right side and 2+ on the left side.      Heart sounds: Normal heart sounds. No murmur heard.    No friction rub. No gallop.   Pulmonary:      Effort: Pulmonary effort is normal. No respiratory distress.      Breath sounds: Normal breath sounds. No wheezing, rhonchi or rales.   Abdominal:      General: Bowel sounds are normal. There is no distension.      Palpations: Abdomen is soft. There is no mass.      Tenderness: There is no abdominal tenderness. There is no guarding or rebound.      Hernia: No hernia is present.   Lymphadenopathy:      Cervical: No cervical adenopathy.   Skin:     General: Skin is warm and dry.   Neurological:      Mental Status: He is alert.      Cranial Nerves: No cranial nerve deficit.      Motor: No tremor.      Deep Tendon Reflexes: Reflexes are normal and symmetric.   Psychiatric:         Speech: Speech normal.         Behavior: Behavior normal.             Assessment:       1. Encounter for preventative adult health care exam with abnormal findings    2. Prostate cancer screening     3. Need for hepatitis C screening test    4. Need for prophylactic vaccination against Streptococcus pneumoniae (pneumococcus)    5. Class 1 obesity with body mass index (BMI) of 32.0 to 32.9 in adult, unspecified obesity type, unspecified whether serious comorbidity present        Plan:       Jacky was seen today for establish care and annual exam.    Diagnoses and all orders for this visit:    Encounter for preventative adult health care exam with abnormal findings  -     Comprehensive Metabolic Panel; Future  -     Lipid Panel; Future  -     PSA, Screening; Future  -     Hepatitis C Antibody; Future  -     Comprehensive Metabolic Panel  -     Lipid Panel  -     PSA, Screening  -     Hepatitis C Antibody    Prostate cancer screening  -     PSA, Screening; Future  -     PSA, Screening    Need for hepatitis C screening test  -     Hepatitis C Antibody; Future  -     Hepatitis C Antibody    Need for prophylactic vaccination against Streptococcus pneumoniae (pneumococcus)  -     Pneumococcal Conjugate Vaccine (20 Valent) (IM)    Class 1 obesity with body mass index (BMI) of 32.0 to 32.9 in adult, unspecified obesity type, unspecified whether serious comorbidity present

## 2022-06-04 LAB
ALBUMIN SERPL-MCNC: 4.7 G/DL (ref 3.8–4.8)
ALBUMIN/GLOB SERPL: 2.5 {RATIO} (ref 1.2–2.2)
ALP SERPL-CCNC: 84 IU/L (ref 44–121)
ALT SERPL-CCNC: 21 IU/L (ref 0–44)
AST SERPL-CCNC: 20 IU/L (ref 0–40)
BILIRUB SERPL-MCNC: 0.5 MG/DL (ref 0–1.2)
BUN SERPL-MCNC: 24 MG/DL (ref 8–27)
BUN/CREAT SERPL: 24 (ref 10–24)
CALCIUM SERPL-MCNC: 9.7 MG/DL (ref 8.6–10.2)
CHLORIDE SERPL-SCNC: 104 MMOL/L (ref 96–106)
CHOLEST SERPL-MCNC: 165 MG/DL (ref 100–199)
CO2 SERPL-SCNC: 24 MMOL/L (ref 20–29)
CREAT SERPL-MCNC: 1 MG/DL (ref 0.76–1.27)
EST. GFR  (NO RACE VARIABLE): 83 ML/MIN/1.73
GLOBULIN SER CALC-MCNC: 1.9 G/DL (ref 1.5–4.5)
GLUCOSE SERPL-MCNC: 89 MG/DL (ref 65–99)
HCV AB S/CO SERPL IA: 0.2 S/CO RATIO (ref 0–0.9)
HDLC SERPL-MCNC: 36 MG/DL
LDLC SERPL CALC-MCNC: 105 MG/DL (ref 0–99)
POTASSIUM SERPL-SCNC: 4.5 MMOL/L (ref 3.5–5.2)
PROT SERPL-MCNC: 6.6 G/DL (ref 6–8.5)
PSA SERPL-MCNC: 0.8 NG/ML (ref 0–4)
SODIUM SERPL-SCNC: 141 MMOL/L (ref 134–144)
TRIGL SERPL-MCNC: 131 MG/DL (ref 0–149)
VLDLC SERPL CALC-MCNC: 24 MG/DL (ref 5–40)

## 2022-06-06 ENCOUNTER — TELEPHONE (OUTPATIENT)
Dept: FAMILY MEDICINE | Facility: CLINIC | Age: 67
End: 2022-06-06

## 2022-06-06 NOTE — TELEPHONE ENCOUNTER
----- Message from Victoriano Mariano Jr., MD sent at 6/6/2022  9:08 AM CDT -----  I have reviewed your results.  They demonstrate no significant or concerning findings.  If you have any additional concerns regarding these tests, please contact me at your convenience.

## 2025-08-04 ENCOUNTER — PATIENT OUTREACH (OUTPATIENT)
Dept: ADMINISTRATIVE | Facility: HOSPITAL | Age: 70
End: 2025-08-04

## 2025-09-04 ENCOUNTER — PATIENT OUTREACH (OUTPATIENT)
Dept: ADMINISTRATIVE | Facility: HOSPITAL | Age: 70
End: 2025-09-04